# Patient Record
Sex: MALE | Race: OTHER | HISPANIC OR LATINO | URBAN - METROPOLITAN AREA
[De-identification: names, ages, dates, MRNs, and addresses within clinical notes are randomized per-mention and may not be internally consistent; named-entity substitution may affect disease eponyms.]

---

## 2017-02-16 ENCOUNTER — EMERGENCY (EMERGENCY)
Facility: HOSPITAL | Age: 42
LOS: 1 days | Discharge: HOME | End: 2017-02-16
Attending: EMERGENCY MEDICINE
Payer: COMMERCIAL

## 2017-02-16 VITALS
HEIGHT: 70 IN | SYSTOLIC BLOOD PRESSURE: 200 MMHG | WEIGHT: 179.9 LBS | HEART RATE: 86 BPM | OXYGEN SATURATION: 97 % | DIASTOLIC BLOOD PRESSURE: 102 MMHG | RESPIRATION RATE: 16 BRPM

## 2017-02-16 DIAGNOSIS — K08.89 OTHER SPECIFIED DISORDERS OF TEETH AND SUPPORTING STRUCTURES: ICD-10-CM

## 2017-02-16 DIAGNOSIS — E78.00 PURE HYPERCHOLESTEROLEMIA, UNSPECIFIED: ICD-10-CM

## 2017-02-16 DIAGNOSIS — E11.649 TYPE 2 DIABETES MELLITUS WITH HYPOGLYCEMIA WITHOUT COMA: ICD-10-CM

## 2017-02-16 DIAGNOSIS — I10 ESSENTIAL (PRIMARY) HYPERTENSION: ICD-10-CM

## 2017-02-16 LAB
ALBUMIN SERPL ELPH-MCNC: 4.1 G/DL — SIGNIFICANT CHANGE UP (ref 3.3–5.2)
ALP SERPL-CCNC: 91 U/L — SIGNIFICANT CHANGE UP (ref 40–120)
ALT FLD-CCNC: 44 U/L — HIGH
ANION GAP SERPL CALC-SCNC: 13 MMOL/L — SIGNIFICANT CHANGE UP (ref 5–17)
ANISOCYTOSIS BLD QL: SLIGHT — SIGNIFICANT CHANGE UP
APTT BLD: 32.9 SEC — SIGNIFICANT CHANGE UP (ref 27.5–37.4)
AST SERPL-CCNC: 30 U/L — SIGNIFICANT CHANGE UP
BASOPHILS # BLD AUTO: 0 K/UL — SIGNIFICANT CHANGE UP (ref 0–0.2)
BASOPHILS NFR BLD AUTO: 0 % — SIGNIFICANT CHANGE UP (ref 0–2)
BILIRUB SERPL-MCNC: 0.1 MG/DL — LOW (ref 0.4–2)
BUN SERPL-MCNC: 41 MG/DL — HIGH (ref 8–20)
CALCIUM SERPL-MCNC: 9 MG/DL — SIGNIFICANT CHANGE UP (ref 8.6–10.2)
CHLORIDE SERPL-SCNC: 102 MMOL/L — SIGNIFICANT CHANGE UP (ref 98–107)
CO2 SERPL-SCNC: 26 MMOL/L — SIGNIFICANT CHANGE UP (ref 22–29)
CREAT SERPL-MCNC: 1.53 MG/DL — HIGH (ref 0.5–1.3)
EOSINOPHIL # BLD AUTO: 0.1 K/UL — SIGNIFICANT CHANGE UP (ref 0–0.5)
EOSINOPHIL NFR BLD AUTO: 0 % — SIGNIFICANT CHANGE UP (ref 0–5)
GLUCOSE SERPL-MCNC: 34 MG/DL — CRITICAL LOW (ref 70–115)
HCT VFR BLD CALC: 32.8 % — LOW (ref 42–52)
HGB BLD-MCNC: 11.1 G/DL — LOW (ref 14–18)
INR BLD: 1.02 RATIO — SIGNIFICANT CHANGE UP (ref 0.88–1.16)
LYMPHOCYTES # BLD AUTO: 2 K/UL — SIGNIFICANT CHANGE UP (ref 1–4.8)
LYMPHOCYTES # BLD AUTO: 23 % — SIGNIFICANT CHANGE UP (ref 20–55)
MCHC RBC-ENTMCNC: 27.3 PG — SIGNIFICANT CHANGE UP (ref 27–31)
MCHC RBC-ENTMCNC: 33.8 G/DL — SIGNIFICANT CHANGE UP (ref 32–36)
MCV RBC AUTO: 80.8 FL — SIGNIFICANT CHANGE UP (ref 80–94)
MICROCYTES BLD QL: SLIGHT — SIGNIFICANT CHANGE UP
MONOCYTES # BLD AUTO: 0.8 K/UL — SIGNIFICANT CHANGE UP (ref 0–0.8)
MONOCYTES NFR BLD AUTO: 5 % — SIGNIFICANT CHANGE UP (ref 3–10)
NEUTROPHILS # BLD AUTO: 4.9 K/UL — SIGNIFICANT CHANGE UP (ref 1.8–8)
NEUTROPHILS NFR BLD AUTO: 67 % — SIGNIFICANT CHANGE UP (ref 37–73)
PLAT MORPH BLD: NORMAL — SIGNIFICANT CHANGE UP
PLATELET # BLD AUTO: 263 K/UL — SIGNIFICANT CHANGE UP (ref 150–400)
POTASSIUM SERPL-MCNC: 4.8 MMOL/L — SIGNIFICANT CHANGE UP (ref 3.5–5.3)
POTASSIUM SERPL-SCNC: 4.8 MMOL/L — SIGNIFICANT CHANGE UP (ref 3.5–5.3)
PROT SERPL-MCNC: 8.2 G/DL — SIGNIFICANT CHANGE UP (ref 6.6–8.7)
PROTHROM AB SERPL-ACNC: 11.2 SEC — SIGNIFICANT CHANGE UP (ref 10–13.1)
RBC # BLD: 4.06 M/UL — LOW (ref 4.6–6.2)
RBC # FLD: 13.1 % — SIGNIFICANT CHANGE UP (ref 11–15.6)
RBC BLD AUTO: ABNORMAL
SODIUM SERPL-SCNC: 141 MMOL/L — SIGNIFICANT CHANGE UP (ref 135–145)
VARIANT LYMPHS # BLD: 5 % — SIGNIFICANT CHANGE UP (ref 0–6)
WBC # BLD: 7.8 K/UL — SIGNIFICANT CHANGE UP (ref 4.8–10.8)
WBC # FLD AUTO: 7.8 K/UL — SIGNIFICANT CHANGE UP (ref 4.8–10.8)

## 2017-02-16 PROCEDURE — 85027 COMPLETE CBC AUTOMATED: CPT

## 2017-02-16 PROCEDURE — 93010 ELECTROCARDIOGRAM REPORT: CPT

## 2017-02-16 PROCEDURE — 82962 GLUCOSE BLOOD TEST: CPT

## 2017-02-16 PROCEDURE — 93005 ELECTROCARDIOGRAM TRACING: CPT

## 2017-02-16 PROCEDURE — 85730 THROMBOPLASTIN TIME PARTIAL: CPT

## 2017-02-16 PROCEDURE — 99284 EMERGENCY DEPT VISIT MOD MDM: CPT | Mod: 25

## 2017-02-16 PROCEDURE — 96374 THER/PROPH/DIAG INJ IV PUSH: CPT

## 2017-02-16 PROCEDURE — 99284 EMERGENCY DEPT VISIT MOD MDM: CPT

## 2017-02-16 PROCEDURE — 80053 COMPREHEN METABOLIC PANEL: CPT

## 2017-02-16 PROCEDURE — 85610 PROTHROMBIN TIME: CPT

## 2017-02-16 RX ORDER — DEXTROSE 50 % IN WATER 50 %
50 SYRINGE (ML) INTRAVENOUS ONCE
Qty: 0 | Refills: 0 | Status: COMPLETED | OUTPATIENT
Start: 2017-02-16 | End: 2017-02-16

## 2017-02-16 RX ADMIN — Medication 50 MILLILITER(S): at 21:08

## 2017-02-16 NOTE — ED PROVIDER NOTE - CONSTITUTIONAL, MLM
normal... Initial impression, pt lethargic appearing but barely responsive to tactile stimuli. However pt responded to d50 and became awake, alert, and oriented.

## 2017-02-16 NOTE — ED PROVIDER NOTE - PROGRESS NOTE DETAILS
1 amp of d50 given Pt was educated and was told to make sure eh is consistently compliant with his BP meds. He was made aware of risks for stroke and MI. Pt was educated on the importance of taking his blood sugar before taking his medication. Pt with 2 normal finger sticks, Pt is stable.

## 2017-02-16 NOTE — ED PROVIDER NOTE - OBJECTIVE STATEMENT
40 y/o M with h/o Type 1 DM and HTN presents to the ED BIBA  minimally responsive. Pt was found by EMS at pt's work, pt was sitting in his chair diaphoretic and minimally responsive to stimuli. Pt's BS in the field was 47, currently in the ED his BS is 38. Pt takes Lantis and Humalog. He states he has been eating a low carb diet recently and has had a toothache. He last took 28 units of Humalog today at 1pm and ate a [employer] sandwich. Pt admits he is not compliant with his BP meds. He denies any CP, SOB, or difficulty breathing. no further complaints at this time.

## 2017-02-16 NOTE — ED PROVIDER NOTE - NS ED MD SCRIBE ATTENDING SCRIBE SECTIONS
DISPOSITION/HISTORY OF PRESENT ILLNESS/REVIEW OF SYSTEMS/HIV/VITAL SIGNS( Pullset)/PAST MEDICAL/SURGICAL/SOCIAL HISTORY/PHYSICAL EXAM

## 2017-02-16 NOTE — ED ADULT NURSE NOTE - OBJECTIVE STATEMENT
Patient A&Ox4, stated took insulin 28 units after eating a sandwich. BGL dropped, last thing he remembered was waking up to a nurse in front of him. Stated he "feels great" at this time. Also stated this is the first time has occurred. Respirations even & unlabored. Denies any chest pain. IV site patent. Will continue to monitor. Patient A&Ox4, stated took insulin 28 units after eating a sandwich. BGL dropped, next thing he remembered was "waking up to a nurse in front of him." Stated he "feels great" at this time. Also stated this is the first time has occurred. Does not check BGL prior to self administering insulin. Educated on importance of checking BGL prior to self-administering insulin. Verbalized an understanding. Respirations even & unlabored. Denies any chest pain. IV site patent. Will continue to monitor.

## 2017-02-17 VITALS
RESPIRATION RATE: 18 BRPM | HEART RATE: 92 BPM | OXYGEN SATURATION: 98 % | SYSTOLIC BLOOD PRESSURE: 176 MMHG | DIASTOLIC BLOOD PRESSURE: 70 MMHG

## 2018-03-30 ENCOUNTER — EMERGENCY (EMERGENCY)
Facility: HOSPITAL | Age: 43
LOS: 1 days | Discharge: LEFT BEFORE DISCHARGE | End: 2018-03-30
Attending: EMERGENCY MEDICINE | Admitting: EMERGENCY MEDICINE
Payer: COMMERCIAL

## 2018-03-30 VITALS
RESPIRATION RATE: 18 BRPM | OXYGEN SATURATION: 100 % | TEMPERATURE: 98 F | HEIGHT: 66 IN | WEIGHT: 175.05 LBS | SYSTOLIC BLOOD PRESSURE: 242 MMHG | HEART RATE: 91 BPM | DIASTOLIC BLOOD PRESSURE: 113 MMHG

## 2018-03-30 PROCEDURE — 99282 EMERGENCY DEPT VISIT SF MDM: CPT | Mod: 25

## 2018-03-30 PROCEDURE — 82962 GLUCOSE BLOOD TEST: CPT

## 2018-03-30 PROCEDURE — 99283 EMERGENCY DEPT VISIT LOW MDM: CPT

## 2018-03-30 NOTE — ED ADULT NURSE NOTE - CHIEF COMPLAINT QUOTE
girlfriend noted pts fs to be 38.  pt was given OJ and oral glucose prior to arrival.  pt noted to be hypertensive was scheduled for fistuala placement today at [place].  pt states that he fasted last night didn't take bp meds but did take insulin

## 2018-03-30 NOTE — ED ADULT TRIAGE NOTE - CHIEF COMPLAINT QUOTE
girlfriend noted pts fs to be 38.  pt was given OJ and oral glucose prior to arrival.  pt noted to be hypertensive was scheduled for fistuala placement today at [place]. girlfriend noted pts fs to be 38.  pt was given OJ and oral glucose prior to arrival.  pt noted to be hypertensive was scheduled for fistuala placement today at [place].  pt states that he fasted last night didn't take bp meds but did take insulin

## 2018-03-30 NOTE — ED PROVIDER NOTE - MEDICAL DECISION MAKING DETAILS
well explained reasons for BP and low glucose today; patient otherwise assymptomatic; recommended staying for labs and correction of sugar and BP; patient however is adamant about making his appointment in 30 minutes to [place] for creation of av fistula; advised at length about potential problems during transport, but insists on signing out AMA; all risks explained, will d/c    Pt understands and recalls risks of leaving against medical advice, including death from hypertensive crisis, stroke, and hypoglycemia. Pt states that pt will see PMD. Pt advised to return to the ED if pt feels worse.

## 2018-03-30 NOTE — ED PROVIDER NOTE - OBJECTIVE STATEMENT
42 y/o M pt with hx of renal failure, HTN (takes Labetalol, Nifedipine, sodium bicarbonate, omeprazole) and DM (Hemolog and Lantis) presents to ED for hypoglycemia. Pt was scheduled for fistula today for future dialysis. In preparation for surgery, he was told to not take HTN medication last night and cut back on insulin. pt usually takes lantis, he took 30 instead of normal 48 last night. pt has renal failure and has been on specific diet the last few days and says his blood sugar has been low. Pt's last PO intake was 16 hours ago (yesterday afternoon). Pt states his girlfriend was worried about his blood sugar and she called EMS. Denies any symptoms at this time. 44 y/o M pt with hx of renal failure, HTN (takes Labetalol, Nifedipine, sodium bicarbonate, omeprazole) and DM (Hemolog and Lantis) presents to ED for hypoglycemia. Pt was scheduled for fistula today for future dialysis. In preparation for surgery, he was told to not take HTN medication last night and cut back on insulin. pt usually takes lantis, he took 30 instead of normal 48 last night. pt has renal failure and has been on specific diet the last few days and says his blood sugar has been low. Pt's last PO intake was 16 hours ago (yesterday afternoon). Pt states his girlfriend was worried about his blood sugar and she called EMS. Denies any symptoms at this time. AGain, did not take HTN meds last night or this morning

## 2018-03-30 NOTE — ED ADULT NURSE NOTE - CAS EDN DISCHARGE INTERVENTIONS
Pt was not assessed by this RN. Pt was AMA'd/dispo'd prior to RN assessment. Paperwork obtained by Dr. Hall

## 2018-04-01 ENCOUNTER — OUTPATIENT (OUTPATIENT)
Dept: OUTPATIENT SERVICES | Facility: HOSPITAL | Age: 43
LOS: 1 days | End: 2018-04-01
Payer: MEDICAID

## 2018-04-01 PROCEDURE — G9001: CPT

## 2018-04-10 DIAGNOSIS — R69 ILLNESS, UNSPECIFIED: ICD-10-CM

## 2018-11-20 ENCOUNTER — APPOINTMENT (OUTPATIENT)
Dept: FAMILY MEDICINE | Facility: CLINIC | Age: 43
End: 2018-11-20
Payer: MEDICAID

## 2018-11-20 VITALS
HEIGHT: 67 IN | HEART RATE: 71 BPM | DIASTOLIC BLOOD PRESSURE: 74 MMHG | WEIGHT: 172 LBS | BODY MASS INDEX: 27 KG/M2 | OXYGEN SATURATION: 98 % | SYSTOLIC BLOOD PRESSURE: 124 MMHG

## 2018-11-20 DIAGNOSIS — Z83.3 FAMILY HISTORY OF DIABETES MELLITUS: ICD-10-CM

## 2018-11-20 DIAGNOSIS — Z12.83 ENCOUNTER FOR SCREENING FOR MALIGNANT NEOPLASM OF SKIN: ICD-10-CM

## 2018-11-20 DIAGNOSIS — Z12.5 ENCOUNTER FOR SCREENING FOR MALIGNANT NEOPLASM OF PROSTATE: ICD-10-CM

## 2018-11-20 DIAGNOSIS — R09.82 POSTNASAL DRIP: ICD-10-CM

## 2018-11-20 PROCEDURE — 99386 PREV VISIT NEW AGE 40-64: CPT | Mod: 25

## 2018-11-20 PROCEDURE — 36415 COLL VENOUS BLD VENIPUNCTURE: CPT

## 2018-11-20 RX ORDER — NIFEDIPINE 30 MG/1
30 TABLET, EXTENDED RELEASE ORAL
Refills: 0 | Status: ACTIVE | COMMUNITY

## 2018-11-20 RX ORDER — FLUTICASONE PROPIONATE 50 UG/1
50 SPRAY, METERED NASAL DAILY
Qty: 1 | Refills: 3 | Status: ACTIVE | COMMUNITY
Start: 2018-11-20 | End: 1900-01-01

## 2018-11-20 RX ORDER — FUROSEMIDE 40 MG/1
40 TABLET ORAL
Refills: 0 | Status: COMPLETED | COMMUNITY
End: 2018-11-20

## 2018-11-20 RX ORDER — INDOMETHACIN 25 MG/1
25 CAPSULE ORAL
Refills: 0 | Status: ACTIVE | COMMUNITY

## 2018-11-20 RX ORDER — CALCIUM ACETATE 667 MG/1
667 CAPSULE ORAL
Refills: 0 | Status: ACTIVE | COMMUNITY

## 2018-11-20 RX ORDER — COLCHICINE 0.6 MG/1
0.6 TABLET, FILM COATED ORAL
Refills: 0 | Status: ACTIVE | COMMUNITY

## 2018-11-20 NOTE — PHYSICAL EXAM

## 2018-11-20 NOTE — HISTORY OF PRESENT ILLNESS
[FreeTextEntry1] : NP-CPE [de-identified] : 43 year old male type 1 diabetic (diagnosed in teens)\par \par does not see an endocrinologist\par \par goes to dialysis 3 times a week ([facility] Dr. Montoya)\par started dialysis in may of this year\par on kidney translplant list through [facility]\par \par take 2 omega 3 tabs daily\par \par last hba1c is 7.9\par \par \par

## 2018-11-21 LAB
25(OH)D3 SERPL-MCNC: 13.2 NG/ML
ALBUMIN SERPL ELPH-MCNC: 4.7 G/DL
ALP BLD-CCNC: 79 U/L
ALT SERPL-CCNC: 22 U/L
ANION GAP SERPL CALC-SCNC: 22 MMOL/L
APPEARANCE: ABNORMAL
AST SERPL-CCNC: 16 U/L
BACTERIA: NEGATIVE
BASOPHILS # BLD AUTO: 0.03 K/UL
BASOPHILS NFR BLD AUTO: 0.3 %
BILIRUB SERPL-MCNC: 0.3 MG/DL
BILIRUBIN URINE: NEGATIVE
BLOOD URINE: ABNORMAL
BUN SERPL-MCNC: 57 MG/DL
CALCIUM SERPL-MCNC: 9 MG/DL
CHLORIDE SERPL-SCNC: 94 MMOL/L
CHOLEST SERPL-MCNC: 250 MG/DL
CHOLEST/HDLC SERPL: 9.6 RATIO
CO2 SERPL-SCNC: 21 MMOL/L
COLOR: YELLOW
CREAT SERPL-MCNC: 8.7 MG/DL
EOSINOPHIL # BLD AUTO: 0.21 K/UL
EOSINOPHIL NFR BLD AUTO: 2.3 %
GLUCOSE QUALITATIVE U: NEGATIVE MG/DL
GLUCOSE SERPL-MCNC: 197 MG/DL
HBA1C MFR BLD HPLC: 8.6 %
HCT VFR BLD CALC: 37.7 %
HCV AB SER QL: NONREACTIVE
HCV S/CO RATIO: 0.25 S/CO
HDLC SERPL-MCNC: 26 MG/DL
HGB BLD-MCNC: 12.3 G/DL
HYALINE CASTS: 0 /LPF
IMM GRANULOCYTES NFR BLD AUTO: 0.4 %
KETONES URINE: ABNORMAL
LDLC SERPL CALC-MCNC: NORMAL
LEUKOCYTE ESTERASE URINE: ABNORMAL
LYMPHOCYTES # BLD AUTO: 2.36 K/UL
LYMPHOCYTES NFR BLD AUTO: 26.3 %
MAN DIFF?: NORMAL
MCHC RBC-ENTMCNC: 28.7 PG
MCHC RBC-ENTMCNC: 32.6 GM/DL
MCV RBC AUTO: 87.9 FL
MICROSCOPIC-UA: NORMAL
MONOCYTES # BLD AUTO: 0.64 K/UL
MONOCYTES NFR BLD AUTO: 7.1 %
NEUTROPHILS # BLD AUTO: 5.71 K/UL
NEUTROPHILS NFR BLD AUTO: 63.6 %
NITRITE URINE: NEGATIVE
PH URINE: 5
PLATELET # BLD AUTO: 352 K/UL
POTASSIUM SERPL-SCNC: 5.2 MMOL/L
PROT SERPL-MCNC: 8.5 G/DL
PROTEIN URINE: 300 MG/DL
PSA FREE FLD-MCNC: 62
PSA FREE SERPL-MCNC: 0.49 NG/ML
PSA SERPL-MCNC: 0.79 NG/ML
RBC # BLD: 4.29 M/UL
RBC # FLD: 14.4 %
RED BLOOD CELLS URINE: 2 /HPF
SODIUM SERPL-SCNC: 137 MMOL/L
SPECIFIC GRAVITY URINE: 1.02
SQUAMOUS EPITHELIAL CELLS: 3 /HPF
TRIGL SERPL-MCNC: 787 MG/DL
TSH SERPL-ACNC: 2.06 UIU/ML
URATE SERPL-MCNC: 5.9 MG/DL
URINE COMMENTS: NORMAL
UROBILINOGEN URINE: NEGATIVE MG/DL
WBC # FLD AUTO: 8.99 K/UL
WHITE BLOOD CELLS URINE: 62 /HPF

## 2018-11-28 ENCOUNTER — APPOINTMENT (OUTPATIENT)
Dept: ENDOCRINOLOGY | Facility: CLINIC | Age: 43
End: 2018-11-28

## 2019-01-15 ENCOUNTER — RECORD ABSTRACTING (OUTPATIENT)
Age: 44
End: 2019-01-15

## 2019-01-23 ENCOUNTER — APPOINTMENT (OUTPATIENT)
Dept: ENDOCRINOLOGY | Facility: CLINIC | Age: 44
End: 2019-01-23
Payer: MEDICAID

## 2019-01-23 VITALS
WEIGHT: 175 LBS | HEART RATE: 67 BPM | HEIGHT: 66 IN | DIASTOLIC BLOOD PRESSURE: 80 MMHG | BODY MASS INDEX: 28.12 KG/M2 | SYSTOLIC BLOOD PRESSURE: 122 MMHG

## 2019-01-23 DIAGNOSIS — Z87.891 PERSONAL HISTORY OF NICOTINE DEPENDENCE: ICD-10-CM

## 2019-01-23 DIAGNOSIS — Z80.9 FAMILY HISTORY OF MALIGNANT NEOPLASM, UNSPECIFIED: ICD-10-CM

## 2019-01-23 DIAGNOSIS — E78.00 PURE HYPERCHOLESTEROLEMIA, UNSPECIFIED: ICD-10-CM

## 2019-01-23 DIAGNOSIS — Z72.3 LACK OF PHYSICAL EXERCISE: ICD-10-CM

## 2019-01-23 PROCEDURE — 99204 OFFICE O/P NEW MOD 45 MIN: CPT | Mod: 25

## 2019-01-23 PROCEDURE — 82962 GLUCOSE BLOOD TEST: CPT

## 2019-01-23 RX ORDER — INSULIN GLARGINE 100 [IU]/ML
100 INJECTION, SOLUTION SUBCUTANEOUS
Refills: 0 | Status: DISCONTINUED | COMMUNITY
End: 2019-01-23

## 2019-01-23 RX ORDER — BLOOD SUGAR DIAGNOSTIC
STRIP MISCELLANEOUS
Qty: 100 | Refills: 0 | Status: ACTIVE | COMMUNITY
Start: 2018-02-14

## 2019-01-23 RX ORDER — FOLIC ACID/VIT B COMPLEX AND C 0.8 MG
0.8 TABLET ORAL
Refills: 0 | Status: ACTIVE | COMMUNITY

## 2019-01-23 RX ORDER — PREDNISONE 10 MG/1
10 TABLET ORAL
Refills: 0 | Status: DISCONTINUED | COMMUNITY
End: 2019-01-23

## 2019-01-23 RX ORDER — PEN NEEDLE, DIABETIC 29 G X1/2"
32G X 4 MM NEEDLE, DISPOSABLE MISCELLANEOUS
Refills: 1 | Status: ACTIVE | COMMUNITY

## 2019-01-23 RX ORDER — VIT B COMP NO.3/FOLIC/C/BIOTIN 1 MG-60 MG
1 TABLET ORAL
Qty: 30 | Refills: 0 | Status: ACTIVE | COMMUNITY
Start: 2018-12-17

## 2019-01-23 RX ORDER — PEN NEEDLE, DIABETIC 29 G X1/2"
32G X 4 MM NEEDLE, DISPOSABLE MISCELLANEOUS
Qty: 4 | Refills: 1 | Status: ACTIVE | COMMUNITY
Start: 2019-01-23 | End: 1900-01-01

## 2019-01-23 RX ORDER — METOCLOPRAMIDE 5 MG/1
5 TABLET ORAL DAILY
Refills: 0 | Status: ACTIVE | COMMUNITY

## 2019-01-27 PROBLEM — Z72.3 DOES NOT EXERCISE: Status: ACTIVE | Noted: 2019-01-27

## 2019-01-27 PROBLEM — Z80.9 FAMILY HISTORY OF MALIGNANT NEOPLASM: Status: ACTIVE | Noted: 2019-01-23

## 2019-01-27 PROBLEM — Z87.891 FORMER SMOKER: Status: ACTIVE | Noted: 2019-01-27

## 2019-01-27 PROBLEM — E78.00 ELEVATED CHOLESTEROL: Status: ACTIVE | Noted: 2019-01-23

## 2019-01-27 NOTE — HISTORY OF PRESENT ILLNESS
[FreeTextEntry1] : Pt with h/o Dm since 2000 - initally controlled on oral meds then transitioned to insulin  a few years later \par Pt lowest A1c 7.0 Highest 8.5 \par  currently with ESRD onHD - awaiting renal transplantation sees Dr Montoya at [facility] \par In past pt was on higher dose of LAntus 48 units but since eating  better - \par  has reduced it to curent dose of 30 units  of Toujeo \par Humalog up to 15 units post meals\par In past pt has had  epsidoes of severe hypoglycemia but has never had LOC \par \par ESRD on HD since June 2018 \par \par +gerd \par  + high TG \par \par laser eye surgery \par \par some trouble digestign foods - had EGD and colonscopy all ok -  [___] : [unfilled] [Hypoglycemia] : hypoglycemic

## 2019-01-27 NOTE — REASON FOR VISIT
[Initial Eval - Existing Diagnosis] : an initial evaluation of an existing diagnosis [FreeTextEntry1] : T1DM

## 2019-01-27 NOTE — ASSESSMENT
[FreeTextEntry1] : 1D with ESRD on HD \par pt interested in pump therapy - would be better if pt did carb counting- will meet with RD CDE \par jolly BS tid ac min  can do some 2 hr p  checks\par goal 2 hr pp 200-220 \par to avoid lowBS for him as he is at high risk for severe hypoglycemia \par decrease toujeo to 26 units \par  not sure what sensors if any can be used with pt on dialysis - currently dexocm and bel are not supported\par  Goal -180 with A1c of 7.5-8 on HD \par ? is pt candidate for  dual pancreas and renal transpant- willdw his transplant team \par \par  However pt with inc TG - ?lipoprotein lipase def - not sure - will check labs \par \par keep on omega 3's \par \par Rash on skin -see derm \par \par ?gastroaptresiss- see GI  ? exocrine pancretic insufficiency \par \par HTN stabel  feet see podiatry to cut thickened nails

## 2019-01-27 NOTE — REVIEW OF SYSTEMS
[Negative] : Heme/Lymph [FreeTextEntry6] : only had dysone a prior to going on HD  [FreeTextEntry7] : food does not digest properly - never tested for gastroparesis

## 2019-04-02 ENCOUNTER — APPOINTMENT (OUTPATIENT)
Dept: FAMILY MEDICINE | Facility: CLINIC | Age: 44
End: 2019-04-02

## 2019-05-06 ENCOUNTER — APPOINTMENT (OUTPATIENT)
Dept: FAMILY MEDICINE | Facility: CLINIC | Age: 44
End: 2019-05-06
Payer: MEDICAID

## 2019-05-06 VITALS
BODY MASS INDEX: 28.12 KG/M2 | HEART RATE: 86 BPM | DIASTOLIC BLOOD PRESSURE: 96 MMHG | WEIGHT: 175 LBS | SYSTOLIC BLOOD PRESSURE: 178 MMHG | OXYGEN SATURATION: 98 % | HEIGHT: 66 IN

## 2019-05-06 VITALS — DIASTOLIC BLOOD PRESSURE: 80 MMHG | SYSTOLIC BLOOD PRESSURE: 160 MMHG

## 2019-05-06 DIAGNOSIS — I10 ESSENTIAL (PRIMARY) HYPERTENSION: ICD-10-CM

## 2019-05-06 PROCEDURE — 99214 OFFICE O/P EST MOD 30 MIN: CPT | Mod: 25

## 2019-05-06 PROCEDURE — 36415 COLL VENOUS BLD VENIPUNCTURE: CPT

## 2019-05-06 RX ORDER — FUROSEMIDE 80 MG/1
80 TABLET ORAL
Qty: 60 | Refills: 0 | Status: DISCONTINUED | COMMUNITY
Start: 2018-07-27 | End: 2019-05-06

## 2019-05-06 RX ORDER — AMOXICILLIN AND CLAVULANATE POTASSIUM 500; 125 MG/1; MG/1
500-125 TABLET, FILM COATED ORAL
Qty: 28 | Refills: 0 | Status: DISCONTINUED | COMMUNITY
Start: 2018-08-29 | End: 2019-05-06

## 2019-05-06 RX ORDER — AMOXICILLIN 500 MG/1
500 TABLET, FILM COATED ORAL
Qty: 30 | Refills: 0 | Status: DISCONTINUED | COMMUNITY
Start: 2018-08-29 | End: 2019-05-06

## 2019-05-06 RX ORDER — ATENOLOL 50 MG/1
50 TABLET ORAL
Qty: 30 | Refills: 0 | Status: COMPLETED | COMMUNITY
Start: 2018-07-26 | End: 2019-05-06

## 2019-05-06 NOTE — PLAN
[FreeTextEntry1] : \par - Advised to decrease toujeo to 30 units due to hypoglycemic episodes, will send rx for the interim until he sees endo \par - Blood work today \par - Follow up with Dr. Addison in 2 wks 5/30/19\par - Encouraged diet modifications and exercise \par \par

## 2019-05-06 NOTE — END OF VISIT
[FreeTextEntry3] : Medical record entries made by the scribe today today, were at my direction and personally dictated to them by me, Dr. Joanna Akins on May 06, 2019. I have reviewed the chart and agree that the record accurately reflects my personal performance of the history, physical exam, assessment, and plan.

## 2019-05-06 NOTE — ADDENDUM
[FreeTextEntry1] : I, Rajani Ying acting as a scribe for Dr. Joanna Akins on May 06, 2019  at 3:21 PM\par

## 2019-05-06 NOTE — HISTORY OF PRESENT ILLNESS
[FreeTextEntry1] : 6 month f/u x kidney disease/DM [de-identified] : LENNIE is a 44 year male here for follow up with a history of T1DM, ESRD on HD, awaiting renal transplant at [facility]. Now has HD M/W/F/Sat 4x a week Followed by Dr. Montoya at [place]. Was recently seen by endocrine Dr. Addison on 1/23/19. Taking toujeo 40 units and has been having hypoglycemic episodes at low as 50 mg/dL. Has a follow up with endo in 2 weeks. Takes metoprolol QHS and nifedipine. BP today 178/96. Did not take nifedipine today due to HD treatment and was told to avoid this med on HD days. Takes 4 fishoil pills per day due to cholesterol. He does not regularly follow up with cardiologist Dr. Gisella Gonzalez, follow up last year.

## 2019-05-30 ENCOUNTER — APPOINTMENT (OUTPATIENT)
Dept: ENDOCRINOLOGY | Facility: CLINIC | Age: 44
End: 2019-05-30

## 2019-08-28 LAB
25(OH)D3 SERPL-MCNC: 10.1 NG/ML
ALBUMIN SERPL ELPH-MCNC: 4.7 G/DL
ALP BLD-CCNC: 56 U/L
ALT SERPL-CCNC: 22 U/L
ANION GAP SERPL CALC-SCNC: 21 MMOL/L
APPEARANCE: ABNORMAL
AST SERPL-CCNC: 14 U/L
BACTERIA: NEGATIVE
BASOPHILS # BLD AUTO: 0.05 K/UL
BASOPHILS NFR BLD AUTO: 0.9 %
BILIRUB SERPL-MCNC: 0.2 MG/DL
BILIRUBIN URINE: NEGATIVE
BLOOD URINE: ABNORMAL
BUN SERPL-MCNC: 55 MG/DL
CALCIUM SERPL-MCNC: 8.6 MG/DL
CHLORIDE SERPL-SCNC: 93 MMOL/L
CHOLEST SERPL-MCNC: 299 MG/DL
CHOLEST/HDLC SERPL: 11.1 RATIO
CO2 SERPL-SCNC: 22 MMOL/L
COLOR: YELLOW
CREAT SERPL-MCNC: 9.63 MG/DL
EOSINOPHIL # BLD AUTO: 0.28 K/UL
EOSINOPHIL NFR BLD AUTO: 5.2 %
ESTIMATED AVERAGE GLUCOSE: 186 MG/DL
GLUCOSE QUALITATIVE U: ABNORMAL
GLUCOSE SERPL-MCNC: 244 MG/DL
HBA1C MFR BLD HPLC: 8.1 %
HCT VFR BLD CALC: 33.5 %
HDLC SERPL-MCNC: 27 MG/DL
HGB BLD-MCNC: 11.2 G/DL
HYALINE CASTS: 0 /LPF
IMM GRANULOCYTES NFR BLD AUTO: 0.6 %
KETONES URINE: NEGATIVE
LDLC SERPL CALC-MCNC: NORMAL MG/DL
LEUKOCYTE ESTERASE URINE: NEGATIVE
LYMPHOCYTES # BLD AUTO: 1.74 K/UL
LYMPHOCYTES NFR BLD AUTO: 32.4 %
MAN DIFF?: NORMAL
MCHC RBC-ENTMCNC: 30.1 PG
MCHC RBC-ENTMCNC: 33.4 GM/DL
MCV RBC AUTO: 90.1 FL
MICROSCOPIC-UA: NORMAL
MONOCYTES # BLD AUTO: 0.69 K/UL
MONOCYTES NFR BLD AUTO: 12.8 %
NEUTROPHILS # BLD AUTO: 2.58 K/UL
NEUTROPHILS NFR BLD AUTO: 48.1 %
NITRITE URINE: NEGATIVE
PH URINE: 6
PLATELET # BLD AUTO: 390 K/UL
POTASSIUM SERPL-SCNC: 5.5 MMOL/L
PROT SERPL-MCNC: 8.2 G/DL
PROTEIN URINE: ABNORMAL
RBC # BLD: 3.72 M/UL
RBC # FLD: 14.6 %
RED BLOOD CELLS URINE: 2 /HPF
SODIUM SERPL-SCNC: 136 MMOL/L
SPECIFIC GRAVITY URINE: 1.02
SQUAMOUS EPITHELIAL CELLS: >27 /HPF
TRIGL SERPL-MCNC: 1175 MG/DL
UROBILINOGEN URINE: NORMAL
WBC # FLD AUTO: 5.37 K/UL
WHITE BLOOD CELLS URINE: 5 /HPF

## 2019-09-16 ENCOUNTER — MEDICATION RENEWAL (OUTPATIENT)
Age: 44
End: 2019-09-16

## 2019-09-17 ENCOUNTER — MEDICATION RENEWAL (OUTPATIENT)
Age: 44
End: 2019-09-17

## 2019-09-18 ENCOUNTER — MEDICATION RENEWAL (OUTPATIENT)
Age: 44
End: 2019-09-18

## 2019-09-18 ENCOUNTER — RX RENEWAL (OUTPATIENT)
Age: 44
End: 2019-09-18

## 2019-09-18 RX ORDER — INSULIN LISPRO 100 U/ML
100 INJECTION, SOLUTION SUBCUTANEOUS
Qty: 3 | Refills: 0 | Status: ACTIVE | COMMUNITY
Start: 1900-01-01 | End: 1900-01-01

## 2019-09-18 RX ORDER — BLOOD SUGAR DIAGNOSTIC
STRIP MISCELLANEOUS
Qty: 4 | Refills: 0 | Status: ACTIVE | COMMUNITY
Start: 2019-09-17 | End: 1900-01-01

## 2019-10-04 RX ORDER — INSULIN GLARGINE 300 U/ML
300 INJECTION, SOLUTION SUBCUTANEOUS
Qty: 1 | Refills: 0 | Status: ACTIVE | COMMUNITY
Start: 1900-01-01 | End: 1900-01-01

## 2019-10-30 ENCOUNTER — APPOINTMENT (OUTPATIENT)
Dept: ENDOCRINOLOGY | Facility: CLINIC | Age: 44
End: 2019-10-30

## 2020-06-18 ENCOUNTER — NON-APPOINTMENT (OUTPATIENT)
Age: 45
End: 2020-06-18

## 2020-06-18 ENCOUNTER — APPOINTMENT (OUTPATIENT)
Dept: FAMILY MEDICINE | Facility: CLINIC | Age: 45
End: 2020-06-18
Payer: MEDICAID

## 2020-06-18 VITALS
HEART RATE: 80 BPM | HEIGHT: 66 IN | OXYGEN SATURATION: 98 % | SYSTOLIC BLOOD PRESSURE: 128 MMHG | TEMPERATURE: 97.8 F | DIASTOLIC BLOOD PRESSURE: 78 MMHG | WEIGHT: 176 LBS | BODY MASS INDEX: 28.28 KG/M2

## 2020-06-18 VITALS — DIASTOLIC BLOOD PRESSURE: 80 MMHG | SYSTOLIC BLOOD PRESSURE: 110 MMHG

## 2020-06-18 DIAGNOSIS — Z00.00 ENCOUNTER FOR GENERAL ADULT MEDICAL EXAMINATION W/OUT ABNORMAL FINDINGS: ICD-10-CM

## 2020-06-18 DIAGNOSIS — Z23 ENCOUNTER FOR IMMUNIZATION: ICD-10-CM

## 2020-06-18 PROCEDURE — G0444 DEPRESSION SCREEN ANNUAL: CPT

## 2020-06-18 PROCEDURE — 36415 COLL VENOUS BLD VENIPUNCTURE: CPT

## 2020-06-18 PROCEDURE — 93000 ELECTROCARDIOGRAM COMPLETE: CPT | Mod: 59

## 2020-06-18 PROCEDURE — 99396 PREV VISIT EST AGE 40-64: CPT | Mod: 25

## 2020-06-18 NOTE — PHYSICAL EXAM
[No Acute Distress] : no acute distress [Well Developed] : well developed [Well Nourished] : well nourished [Well-Appearing] : well-appearing [PERRL] : pupils equal round and reactive to light [Normal Sclera/Conjunctiva] : normal sclera/conjunctiva [EOMI] : extraocular movements intact [Normal Outer Ear/Nose] : the outer ears and nose were normal in appearance [Normal Oropharynx] : the oropharynx was normal [No JVD] : no jugular venous distention [Supple] : supple [No Lymphadenopathy] : no lymphadenopathy [No Accessory Muscle Use] : no accessory muscle use [Thyroid Normal, No Nodules] : the thyroid was normal and there were no nodules present [No Respiratory Distress] : no respiratory distress  [Regular Rhythm] : with a regular rhythm [Normal Rate] : normal rate  [Clear to Auscultation] : lungs were clear to auscultation bilaterally [No Murmur] : no murmur heard [Normal S1, S2] : normal S1 and S2 [No Abdominal Bruit] : a ~M bruit was not heard ~T in the abdomen [No Carotid Bruits] : no carotid bruits [No Varicosities] : no varicosities [Pedal Pulses Present] : the pedal pulses are present [No Palpable Aorta] : no palpable aorta [No Edema] : there was no peripheral edema [No Extremity Clubbing/Cyanosis] : no extremity clubbing/cyanosis [Non Tender] : non-tender [Soft] : abdomen soft [Non-distended] : non-distended [No Masses] : no abdominal mass palpated [No HSM] : no HSM [Normal Bowel Sounds] : normal bowel sounds [Normal Posterior Cervical Nodes] : no posterior cervical lymphadenopathy [Normal Anterior Cervical Nodes] : no anterior cervical lymphadenopathy [No CVA Tenderness] : no CVA  tenderness [No Spinal Tenderness] : no spinal tenderness [Grossly Normal Strength/Tone] : grossly normal strength/tone [No Joint Swelling] : no joint swelling [No Rash] : no rash [Coordination Grossly Intact] : coordination grossly intact [No Focal Deficits] : no focal deficits [Normal Gait] : normal gait [Deep Tendon Reflexes (DTR)] : deep tendon reflexes were 2+ and symmetric [Normal Affect] : the affect was normal [Normal Insight/Judgement] : insight and judgment were intact

## 2020-06-18 NOTE — HEALTH RISK ASSESSMENT
[Good] : ~his/her~  mood as  good [None] : None [Employed] : employed [With Family] : lives with family [] :  [Fully functional (bathing, dressing, toileting, transferring, walking, feeding)] : Fully functional (bathing, dressing, toileting, transferring, walking, feeding) [Feels Safe at Home] : Feels safe at home [Fully functional (using the telephone, shopping, preparing meals, housekeeping, doing laundry, using] : Fully functional and needs no help or supervision to perform IADLs (using the telephone, shopping, preparing meals, housekeeping, doing laundry, using transportation, managing medications and managing finances)

## 2020-06-18 NOTE — HISTORY OF PRESENT ILLNESS
[FreeTextEntry1] : CPE [de-identified] : LENNIE is a 45 year male here for CPE. Mood is good. Currently taking Admelog 15 units x2 day, Toujeo 30 units q HS. Taking Metoprolol Succinate 50 mg, Metoclopramide 5 mg, Indomethacin 25 mg, Colchicine 0.6 mg, Phos Binder 667 mg daily. Taking Nifedipine 30 mg PRN. Following up with endocrinology in [place] 8/20. Last appointment was in November. Still waiting for kidney transplant. Has not felt sick in over 10 months. Receiving dialysis q other day, follows up with nephrologist x3 week. Denies COVID sxs. \par Follows up with Ophthalmologist q 3 months for diabetic retinopathy/cataracts. Last treatment was last week. Requesting podiatry referral due to hx toenail fungus. Pt reports he had pneumo vaccine 11/19.

## 2020-06-18 NOTE — END OF VISIT
[FreeTextEntry3] : Medical record entries made by the scribe today today, were at my direction and personally dictated to them by me, Dr. Joanna Akins on Jun 18, 2020. I have reviewed the chart and agree that the record accurately reflects my personal performance of the history, physical exam, assessment, and plan.\par \par

## 2020-06-18 NOTE — ADDENDUM
[FreeTextEntry1] : I, Lori Castorena acting as a scribe for Dr. Joanna Akins on Jun 18, 2020  at 8:46 AM\par

## 2020-06-19 LAB
25(OH)D3 SERPL-MCNC: 19.3 NG/ML
ALBUMIN SERPL ELPH-MCNC: 4.8 G/DL
ALP BLD-CCNC: 93 U/L
ALT SERPL-CCNC: 54 U/L
ANION GAP SERPL CALC-SCNC: 23 MMOL/L
APPEARANCE: CLEAR
AST SERPL-CCNC: 16 U/L
BACTERIA: NEGATIVE
BASOPHILS # BLD AUTO: 0.07 K/UL
BASOPHILS NFR BLD AUTO: 1.2 %
BILIRUB SERPL-MCNC: 0.3 MG/DL
BILIRUBIN URINE: NEGATIVE
BLOOD URINE: NORMAL
BUN SERPL-MCNC: 51 MG/DL
CALCIUM SERPL-MCNC: 8.8 MG/DL
CHLORIDE SERPL-SCNC: 91 MMOL/L
CHOLEST SERPL-MCNC: 284 MG/DL
CHOLEST/HDLC SERPL: 11.9 RATIO
CO2 SERPL-SCNC: 23 MMOL/L
COLOR: NORMAL
CREAT SERPL-MCNC: 8.97 MG/DL
EOSINOPHIL # BLD AUTO: 0.19 K/UL
EOSINOPHIL NFR BLD AUTO: 3.1 %
ESTIMATED AVERAGE GLUCOSE: 157 MG/DL
GLUCOSE QUALITATIVE U: NORMAL
GLUCOSE SERPL-MCNC: 180 MG/DL
GRANULAR CASTS: 0 /LPF
HBA1C MFR BLD HPLC: 7.1 %
HCT VFR BLD CALC: 44.2 %
HDLC SERPL-MCNC: 24 MG/DL
HGB BLD-MCNC: 13.9 G/DL
HIV1+2 AB SPEC QL IA.RAPID: NONREACTIVE
HYALINE CASTS: 1 /LPF
IMM GRANULOCYTES NFR BLD AUTO: 0.3 %
KETONES URINE: NEGATIVE
LDLC SERPL CALC-MCNC: NORMAL MG/DL
LEUKOCYTE ESTERASE URINE: ABNORMAL
LYMPHOCYTES # BLD AUTO: 1.86 K/UL
LYMPHOCYTES NFR BLD AUTO: 30.6 %
MAN DIFF?: NORMAL
MCHC RBC-ENTMCNC: 28.5 PG
MCHC RBC-ENTMCNC: 31.4 GM/DL
MCV RBC AUTO: 90.6 FL
MICROSCOPIC-UA: NORMAL
MONOCYTES # BLD AUTO: 0.77 K/UL
MONOCYTES NFR BLD AUTO: 12.7 %
NEUTROPHILS # BLD AUTO: 3.16 K/UL
NEUTROPHILS NFR BLD AUTO: 52.1 %
NITRITE URINE: NEGATIVE
PH URINE: 6.5
PLATELET # BLD AUTO: 231 K/UL
POTASSIUM SERPL-SCNC: 5.8 MMOL/L
PROT SERPL-MCNC: 8.3 G/DL
PROTEIN URINE: ABNORMAL
PSA FREE FLD-MCNC: 68 %
PSA FREE SERPL-MCNC: 0.6 NG/ML
PSA SERPL-MCNC: 0.87 NG/ML
RBC # BLD: 4.88 M/UL
RBC # FLD: 13.6 %
RED BLOOD CELLS URINE: 6 /HPF
SARS-COV-2 IGG SERPL IA-ACNC: 0.01 INDEX
SARS-COV-2 IGG SERPL QL IA: NEGATIVE
SODIUM SERPL-SCNC: 137 MMOL/L
SPECIFIC GRAVITY URINE: 1.02
SQUAMOUS EPITHELIAL CELLS: 5 /HPF
TRIGL SERPL-MCNC: 1042 MG/DL
TSH SERPL-ACNC: 2.26 UIU/ML
URATE SERPL-MCNC: 5.7 MG/DL
URINE COMMENTS: NORMAL
UROBILINOGEN URINE: NORMAL
WBC # FLD AUTO: 6.07 K/UL
WHITE BLOOD CELLS URINE: 42 /HPF

## 2020-07-06 ENCOUNTER — TRANSCRIPTION ENCOUNTER (OUTPATIENT)
Age: 45
End: 2020-07-06

## 2020-07-06 DIAGNOSIS — Z11.59 ENCOUNTER FOR SCREENING FOR OTHER VIRAL DISEASES: ICD-10-CM

## 2020-07-30 ENCOUNTER — APPOINTMENT (OUTPATIENT)
Dept: UROLOGY | Facility: CLINIC | Age: 45
End: 2020-07-30

## 2020-08-13 ENCOUNTER — APPOINTMENT (OUTPATIENT)
Dept: UROLOGY | Facility: CLINIC | Age: 45
End: 2020-08-13
Payer: MEDICAID

## 2020-08-13 VITALS
HEIGHT: 66 IN | HEART RATE: 81 BPM | TEMPERATURE: 97.6 F | WEIGHT: 176 LBS | BODY MASS INDEX: 28.28 KG/M2 | SYSTOLIC BLOOD PRESSURE: 144 MMHG | DIASTOLIC BLOOD PRESSURE: 80 MMHG

## 2020-08-13 DIAGNOSIS — R31.29 OTHER MICROSCOPIC HEMATURIA: ICD-10-CM

## 2020-08-13 PROCEDURE — 99204 OFFICE O/P NEW MOD 45 MIN: CPT

## 2020-08-13 NOTE — PHYSICAL EXAM
[General Appearance - In No Acute Distress] : no acute distress [Normal Appearance] : normal appearance [General Appearance - Well Developed] : well developed [FreeTextEntry1] : normal peripheral circulation  [] : no respiratory distress [Abdomen Tenderness] : non-tender [Abdomen Soft] : soft [Abdomen Mass (___ Cm)] : no abdominal mass palpated [Costovertebral Angle Tenderness] : no ~M costovertebral angle tenderness [Urethral Meatus] : meatus normal [Epididymis] : the epididymides were normal [Penis Abnormality] : normal uncircumcised penis [Scrotum] : the scrotum was normal [Prostate Tenderness] : the prostate was not tender [Testes Mass (___cm)] : there were no testicular masses [Testes Tenderness] : no tenderness of the testes [No Prostate Nodules] : no prostate nodules [Prostate Size ___ gm] : prostate size [unfilled] gm [Skin Color & Pigmentation] : normal skin color and pigmentation [Normal Station and Gait] : the gait and station were normal for the patient's age [Oriented To Time, Place, And Person] : oriented to person, place, and time [No Focal Deficits] : no focal deficits [No Palpable Adenopathy] : no palpable adenopathy

## 2020-08-13 NOTE — ASSESSMENT
[FreeTextEntry1] : Anejaculation:\par Discussed it could from long standing Diabetes. \par \par Erectile dysfunction:\par Reviewed pathophysiology and differential diagnosis of erectile dysfunction with the patient. Discussed lifestyle changes. \par The patient was made aware that the current therapies for erectile dysfunction consisting of oral phosphodiesterase type 5 inhibitors, intra-urethral alprostadil, intracavernous vasoactive drug injection, vacuum constriction devices and penile prosthesis implantation. Relative risks and benefits, were discussed. All questions were answered.\par Will get Total, Free and Bio available Testosterone with Estradiol, LH, FSH and PRL. \par Asked to see Dr Garcai for further management of Penile duplex study and possible Intracavernosal injections Vs Inflatable Penile Prosthesis. \par \par Microhematuria:\par Discussed the differential diagnosis of hematuria including benign and malignant pathology- including but not limited to nephrolithiasis, bladder stone, urinary tract infection, glomerular disease, renal cancer, bladder cancer, prostate cancer. We also discussed the chance that workup will not reveal a source for the bleeding. The patient understands that the hematuria could be from an upper tract (kidney or ureter) or lower tract (bladder, urethra, prostate) and that workup includes imaging and direct visualization of all of these.\par \par Recommended work up with Urinalysis with microscopy, Urine culture, Urine cytology, CT Urogram and Cystoscopy. Will consider it. \par \par Wants to focus on Erectile dysfunction right now. \par

## 2020-08-13 NOTE — HISTORY OF PRESENT ILLNESS
[FreeTextEntry1] : 46 yo male presents for Erectile dysfunction. \par Has problem with attaining and maintaining erections. Rates erections as 0/5. \par Reports decreased libido. Has tried Phosphodiesterase 5 inhibitors in the past- partial response: 1/5. \par [facility] and was able to get good response. \par Also complaining of no ejaculation. Recently  and will like to be able to have child. \par \par Has long standing history of Diabetes. Has ESRD on HD. \par Urinates small amount 1-2 x a day. \par Denies dysuria, hematuria, lower abdominal or flank pain, fever, chills or rigors.

## 2020-08-14 LAB
ESTRADIOL SERPL-MCNC: 72 PG/ML
FSH SERPL-MCNC: 1.5 IU/L
LH SERPL-ACNC: 11.5 IU/L
PROLACTIN SERPL-MCNC: 14.2 NG/ML

## 2020-08-18 LAB
TESTOST BND SERPL-MCNC: 16.6 NG/DL
TESTOSTERONE BIOAVAILABLE: 245 NG/DL
TESTOSTERONE TOTAL S: 874 NG/DL

## 2020-08-27 ENCOUNTER — APPOINTMENT (OUTPATIENT)
Dept: FAMILY MEDICINE | Facility: CLINIC | Age: 45
End: 2020-08-27
Payer: MEDICAID

## 2020-08-27 VITALS
TEMPERATURE: 97.4 F | HEART RATE: 78 BPM | WEIGHT: 161 LBS | SYSTOLIC BLOOD PRESSURE: 102 MMHG | HEIGHT: 66 IN | DIASTOLIC BLOOD PRESSURE: 72 MMHG | OXYGEN SATURATION: 98 % | BODY MASS INDEX: 25.88 KG/M2

## 2020-08-27 DIAGNOSIS — H26.9 UNSPECIFIED CATARACT: ICD-10-CM

## 2020-08-27 PROCEDURE — 99214 OFFICE O/P EST MOD 30 MIN: CPT

## 2020-08-27 RX ORDER — METOPROLOL SUCCINATE 50 MG/1
50 TABLET, EXTENDED RELEASE ORAL
Refills: 0 | Status: DISCONTINUED | COMMUNITY
End: 2020-08-27

## 2020-08-27 NOTE — RESULTS/DATA
[] : not indicated [de-identified] : no acute changes\par compared to prior year had negative stress in 8/19

## 2020-08-27 NOTE — HISTORY OF PRESENT ILLNESS
[No Pertinent Cardiac History] : no history of aortic stenosis, atrial fibrillation, coronary artery disease, recent myocardial infarction, or implantable device/pacemaker [No Pertinent Pulmonary History] : no history of asthma, COPD, sleep apnea, or smoking [No Adverse Anesthesia Reaction] : no adverse anesthesia reaction in self or family member [Aortic Stenosis] : no aortic stenosis [Atrial Fibrillation] : no atrial fibrillation [Coronary Artery Disease] : no coronary artery disease [Recent Myocardial Infarction] : no recent myocardial infarction [Implantable Device/Pacemaker] : no implantable device/pacemaker [Asthma] : no asthma [COPD] : no COPD [Smoker] : not a smoker [Sleep Apnea] : no sleep apnea [Chronic Anticoagulation] : no chronic anticoagulation [Diabetes] : no diabetes [Chronic Kidney Disease] : no chronic kidney disease [FreeTextEntry1] : left eye cataract surgery  [FreeTextEntry2] : 09/08/2020 [FreeTextEntry4] : LENNIE is a 45 year male here for a medical clearance. Pt is having a LT eye cataract surgery on 9/8/20 with Dr. Chris Verduzco due to vision issues. Pt stated that he will be Covid tested 72 hours before and he was weaned off of metoprolol due to his regular dialysis. Pt reported that he has been taking Omega 3 for his elevated cholesterol.  [FreeTextEntry3] : Dr.Scott Verduzco

## 2020-08-27 NOTE — ASSESSMENT
[Patient Optimized for Surgery] : Patient optimized for surgery [No Further Testing Recommended] : no further testing recommended [FreeTextEntry4] : Reviewed hx with patient, no contradictions. Pt is medically stable. Currently taking Admelog Solostar 15 units , Calcium Acetate 667 Mg , Colcrys 0.6 MG , Fluticasone 50 MCG , Metoclopramide HCI 5 MG , Nephro-Aurelio .8 MG , NIFEdipine ER 30 MG , Rosuvastatin 20 MG, Omega 3 and Toujeo Max Solostar 30 units daily.

## 2020-08-27 NOTE — END OF VISIT
[FreeTextEntry3] : All medical record entries made by the benibrodger were at my, Dr. Joanna Akins direction and personally dictated by me on 08/27/2020 . I have reviewed the chart and agree that the record accurately reflects my personal performance of the history, physical exam, assessment and plan. I have also personally directed, reviewed, and agreed with the chart.

## 2020-09-01 ENCOUNTER — APPOINTMENT (OUTPATIENT)
Dept: UROLOGY | Facility: CLINIC | Age: 45
End: 2020-09-01
Payer: MEDICAID

## 2020-09-01 VITALS — TEMPERATURE: 98.8 F

## 2020-09-01 PROCEDURE — 99215 OFFICE O/P EST HI 40 MIN: CPT

## 2020-09-01 NOTE — ASSESSMENT
[FreeTextEntry1] : This pleasant  gentleman presents for evaluation of his sexual dysfunction. I have requested several blood studies.  Urine dip and microscopic analysis was requested. I have suggested a diagnostic injection and duplex ultrasound to evaluate his penile vasculature.  I will be better able to make more specific recommendations after additional test results return. \par 1. Review blood tests on follow up\par 2. Penile duplex study on follow up\par \par Consultation: 40 minutes  20 minutes reviewing his history and performing a physical examination.  20 minutes reviewing his prior medical records, discussing treatment options, writing prescriptions and request for blood studies and writing his note.\par \par \par \par \par \par \par

## 2020-09-01 NOTE — PHYSICAL EXAM
[General Appearance - Well Developed] : well developed [General Appearance - Well Nourished] : well nourished [Normal Appearance] : normal appearance [Well Groomed] : well groomed [General Appearance - In No Acute Distress] : no acute distress [Heart Rate And Rhythm] : Heart rate and rhythm were normal [Arterial Pulses Normal] : the pedal pulses were normal [Edema] : no peripheral edema [Respiration, Rhythm And Depth] : normal respiratory rhythm and effort [Exaggerated Use Of Accessory Muscles For Inspiration] : no accessory muscle use [Auscultation Breath Sounds / Voice Sounds] : lungs were clear to auscultation bilaterally [Chest Palpation] : palpation of the chest revealed no abnormalities [Lungs Percussion] : the lungs were normal to percussion [Bowel Sounds] : normal bowel sounds [Abdomen Soft] : soft [Abdomen Tenderness] : non-tender [Abdomen Mass (___ Cm)] : no abdominal mass palpated [Abdomen Hernia] : no hernia was discovered [Costovertebral Angle Tenderness] : no ~M costovertebral angle tenderness [Urethral Meatus] : meatus normal [Urinary Bladder Findings] : the bladder was normal on palpation [Scrotum] : the scrotum was normal [Rectal Exam - Seminal Vesicles] : the seminal vesicles were normal [Epididymis] : the epididymides were normal [Testes Tenderness] : no tenderness of the testes [Testes Mass (___cm)] : there were no testicular masses [Anus Abnormality] : the anus and perineum were normal [Prostate Enlargement] : the prostate was not enlarged [Prostate Tenderness] : the prostate was not tender [No Prostate Nodules] : no prostate nodules [Normal Station and Gait] : the gait and station were normal for the patient's age [Skin Color & Pigmentation] : normal skin color and pigmentation [Skin Turgor] : supple [] : no rash [Skin Lesions] : no skin lesions [No Focal Deficits] : no focal deficits [Sensation] : the sensory exam was normal to light touch and pinprick [Motor Exam] : the motor exam was normal [Oriented To Time, Place, And Person] : oriented to person, place, and time [Affect] : the affect was normal [Mood] : the mood was normal [Not Anxious] : not anxious [No Palpable Adenopathy] : no palpable adenopathy [Cervical Lymph Nodes Enlarged Posterior Bilaterally] : posterior cervical [Cervical Lymph Nodes Enlarged Anterior Bilaterally] : anterior cervical [Supraclavicular Lymph Nodes Enlarged Bilaterally] : supraclavicular [Axillary Lymph Nodes Enlarged Bilaterally] : axillary [Femoral Lymph Nodes Enlarged Bilaterally] : femoral [Inguinal Lymph Nodes Enlarged Bilaterally] : inguinal [Penis Abnormality] : normal uncircumcised penis

## 2020-09-01 NOTE — HISTORY OF PRESENT ILLNESS
[FreeTextEntry1] : Patient is a 45-year-old diabetic male with chronic renal disease on dialysis who presents with a chief complaint of erectile dysfunction.  We reviewed the questionnaire he completed in detail. He was  one year prior. He had been on penile injection therapy in the past. His erections are not modified with the degree of sexual stimulation.   He states that his erections presently are often less than 0 out of 10 in both tumescence and rigidity, insufficient for penetration.   He often ejaculates through a flaccid phallus.  He has difficulty maintaining an erection. He describes a normal libido.  His sexual dysfunction occurs with both sexual relations and masturbation.  His erections are not improved with PDE5 inhibitors.   His partner is understanding and was unable to be with him at the visit today. He is .  \par \par His past medical history is non-contributory.  In his present occupation he has no known toxin exposure. He does not smoke and drinks socially .  He has no known drug allergies. His review of systems and past medical history demonstrates no significant urologic issues (see patient completed questionnaire). His family history demonstrates no significant urologic issues.

## 2020-09-02 DIAGNOSIS — Z01.818 ENCOUNTER FOR OTHER PREPROCEDURAL EXAMINATION: ICD-10-CM

## 2020-09-03 LAB
25(OH)D3 SERPL-MCNC: 15.7 NG/ML
ALBUMIN SERPL ELPH-MCNC: 4.5 G/DL
ALP BLD-CCNC: 53 U/L
ALT SERPL-CCNC: 19 U/L
ANION GAP SERPL CALC-SCNC: 21 MMOL/L
AST SERPL-CCNC: 14 U/L
BASOPHILS # BLD AUTO: 0.06 K/UL
BASOPHILS NFR BLD AUTO: 0.9 %
BILIRUB SERPL-MCNC: 0.2 MG/DL
BUN SERPL-MCNC: 67 MG/DL
CALCIUM SERPL-MCNC: 9.8 MG/DL
CHLORIDE SERPL-SCNC: 92 MMOL/L
CHOLEST SERPL-MCNC: 244 MG/DL
CHOLEST/HDLC SERPL: 10.4 RATIO
CO2 SERPL-SCNC: 25 MMOL/L
CREAT SERPL-MCNC: 9.42 MG/DL
EOSINOPHIL # BLD AUTO: 0.2 K/UL
EOSINOPHIL NFR BLD AUTO: 3 %
ESTIMATED AVERAGE GLUCOSE: 157 MG/DL
ESTRADIOL SERPL-MCNC: 62 PG/ML
FSH SERPL-MCNC: 1.5 IU/L
GLUCOSE SERPL-MCNC: 140 MG/DL
HBA1C MFR BLD HPLC: 7.1 %
HCT VFR BLD CALC: 41.1 %
HDLC SERPL-MCNC: 24 MG/DL
HGB BLD-MCNC: 13.4 G/DL
IMM GRANULOCYTES NFR BLD AUTO: 0.5 %
LDLC SERPL CALC-MCNC: NORMAL MG/DL
LH SERPL-ACNC: 9.1 IU/L
LYMPHOCYTES # BLD AUTO: 2.34 K/UL
LYMPHOCYTES NFR BLD AUTO: 35.7 %
MAN DIFF?: NORMAL
MCHC RBC-ENTMCNC: 29.1 PG
MCHC RBC-ENTMCNC: 32.6 GM/DL
MCV RBC AUTO: 89.3 FL
MONOCYTES # BLD AUTO: 0.87 K/UL
MONOCYTES NFR BLD AUTO: 13.3 %
NEUTROPHILS # BLD AUTO: 3.06 K/UL
NEUTROPHILS NFR BLD AUTO: 46.6 %
PLATELET # BLD AUTO: 324 K/UL
POTASSIUM SERPL-SCNC: 5.2 MMOL/L
PROLACTIN SERPL-MCNC: 14.3 NG/ML
PROT SERPL-MCNC: 7.7 G/DL
PSA SERPL-MCNC: 0.69 NG/ML
RBC # BLD: 4.6 M/UL
RBC # FLD: 14.3 %
SODIUM SERPL-SCNC: 138 MMOL/L
TRIGL SERPL-MCNC: 807 MG/DL
TSH SERPL-ACNC: 1.57 UIU/ML
WBC # FLD AUTO: 6.56 K/UL

## 2020-09-05 ENCOUNTER — APPOINTMENT (OUTPATIENT)
Dept: DISASTER EMERGENCY | Facility: CLINIC | Age: 45
End: 2020-09-05

## 2020-09-05 LAB
TESTOST BND SERPL-MCNC: 17.3 PG/ML
TESTOST SERPL-MCNC: 486.5 NG/DL

## 2020-09-06 LAB — SARS-COV-2 N GENE NPH QL NAA+PROBE: NOT DETECTED

## 2020-09-08 ENCOUNTER — OUTPATIENT (OUTPATIENT)
Dept: OUTPATIENT SERVICES | Facility: HOSPITAL | Age: 45
LOS: 1 days | End: 2020-09-08

## 2020-09-17 ENCOUNTER — OUTPATIENT (OUTPATIENT)
Dept: OUTPATIENT SERVICES | Facility: HOSPITAL | Age: 45
LOS: 1 days | End: 2020-09-17
Payer: MEDICARE

## 2020-09-17 ENCOUNTER — APPOINTMENT (OUTPATIENT)
Dept: UROLOGY | Facility: CLINIC | Age: 45
End: 2020-09-17
Payer: MEDICAID

## 2020-09-17 VITALS — TEMPERATURE: 97.9 F

## 2020-09-17 DIAGNOSIS — R35.0 FREQUENCY OF MICTURITION: ICD-10-CM

## 2020-09-17 PROCEDURE — 54235 NJX CORPORA CAVERNOSA RX AGT: CPT

## 2020-09-17 PROCEDURE — 93980 PENILE VASCULAR STUDY: CPT | Mod: 26

## 2020-09-17 PROCEDURE — 99213 OFFICE O/P EST LOW 20 MIN: CPT | Mod: 25

## 2020-09-17 PROCEDURE — 93980 PENILE VASCULAR STUDY: CPT

## 2020-09-17 NOTE — ASSESSMENT
[FreeTextEntry1] : The patient returns for follow-up to review his recent blood studies and to discuss options for therapy. Blood studies demonstrated an elevated estradiol a low vitamin D 25 and elevated creatinine consistent with his chronic renal failure and an elevated serum glucose and dyslipidemia.  He is presently under medical care hemoglobin A1c is 7.1 testosterone was normal.\par \par Duplex ultrasound demonstrated significant areteriogenic dysfunction. Medical evaluation was recommended. Have him back for injection training of 0.6 cc of the Trimix.\par \par Consultation: 20 minutes  10 minutes reviewing his history and performing a physical examination.  10 minutes reviewing his ultrasound, discussing treatment options, writing prescriptions and request for blood studies and writing his note..\par \par \par \par \par \par \par

## 2020-09-17 NOTE — HISTORY OF PRESENT ILLNESS
[FreeTextEntry1] : The patient returns to review his recent blood studies and to discuss options for therapy.\par \par PMH: Patient is a  diabetic male with chronic renal disease on dialysis who presents with a chief complaint of erectile dysfunction. He had been on penile injection therapy in the past. His erections are not modified with the degree of sexual stimulation.   He states that his erections presently are often less than 0 out of 10 in both tumescence and rigidity, insufficient for penetration.   He often ejaculates through a flaccid phallus.  He has difficulty maintaining an erection. He describes a normal libido.  His sexual dysfunction occurs with both sexual relations and masturbation.  His erections are not improved with PDE5 inhibitors.   His partner is understanding and was unable to be with him at the visit today. He is .  \par \par His past medical history is non-contributory.  In his present occupation he has no known toxin exposure. He does not smoke and drinks socially .  He has no known drug allergies. His review of systems and past medical history demonstrates no significant urologic issues (see patient completed questionnaire). His family history demonstrates no significant urologic issues.

## 2020-09-24 ENCOUNTER — APPOINTMENT (OUTPATIENT)
Dept: FAMILY MEDICINE | Facility: CLINIC | Age: 45
End: 2020-09-24
Payer: MEDICARE

## 2020-09-24 VITALS
OXYGEN SATURATION: 97 % | BODY MASS INDEX: 27.48 KG/M2 | WEIGHT: 171 LBS | DIASTOLIC BLOOD PRESSURE: 82 MMHG | HEIGHT: 66 IN | TEMPERATURE: 97.9 F | HEART RATE: 57 BPM | SYSTOLIC BLOOD PRESSURE: 130 MMHG

## 2020-09-24 DIAGNOSIS — E10.9 TYPE 1 DIABETES MELLITUS WITHOUT COMPLICATIONS: ICD-10-CM

## 2020-09-24 DIAGNOSIS — N52.9 MALE ERECTILE DYSFUNCTION, UNSPECIFIED: ICD-10-CM

## 2020-09-24 PROCEDURE — 99213 OFFICE O/P EST LOW 20 MIN: CPT

## 2020-09-24 RX ORDER — ROSUVASTATIN CALCIUM 20 MG/1
20 TABLET, FILM COATED ORAL
Qty: 90 | Refills: 1 | Status: ACTIVE | COMMUNITY
Start: 2020-06-24 | End: 1900-01-01

## 2020-09-24 NOTE — PLAN
[FreeTextEntry1] : follow up in 6-8 weeks for repeat labs\par may increase dose of statin at that time

## 2020-09-24 NOTE — HISTORY OF PRESENT ILLNESS
[FreeTextEntry1] : pt. f/u with HLD.  [de-identified] : started rosuvastin about 3 months\par \par takes one omega 3 a day\par \par states he does eat a low carb low fat diet\par had labs done earlier this month at urologist\par tc down 40 points\par trigs down 200 points but still signioficantly elevated\par \par

## 2020-09-29 RX ORDER — OMEGA-3-ACID ETHYL ESTERS CAPSULES 1 G/1
1 CAPSULE, LIQUID FILLED ORAL
Qty: 360 | Refills: 1 | Status: ACTIVE | COMMUNITY
Start: 2020-09-24

## 2020-10-13 ENCOUNTER — APPOINTMENT (OUTPATIENT)
Dept: UROLOGY | Facility: CLINIC | Age: 45
End: 2020-10-13
Payer: MEDICARE

## 2020-10-27 ENCOUNTER — APPOINTMENT (OUTPATIENT)
Dept: UROLOGY | Facility: CLINIC | Age: 45
End: 2020-10-27
Payer: MEDICARE

## 2020-10-27 VITALS
SYSTOLIC BLOOD PRESSURE: 186 MMHG | TEMPERATURE: 97.1 F | HEIGHT: 66 IN | DIASTOLIC BLOOD PRESSURE: 93 MMHG | BODY MASS INDEX: 27.48 KG/M2 | RESPIRATION RATE: 16 BRPM | HEART RATE: 86 BPM | WEIGHT: 171 LBS

## 2020-10-27 VITALS — TEMPERATURE: 97.1 F

## 2020-10-27 PROCEDURE — 99214 OFFICE O/P EST MOD 30 MIN: CPT

## 2020-10-27 NOTE — HISTORY OF PRESENT ILLNESS
[FreeTextEntry1] : The patient returns for injection training and to discuss options for fertility.\par \par PMH: Patient is a  diabetic male with chronic renal disease on dialysis who presents with a chief complaint of erectile dysfunction. He had been on penile injection therapy in the past. His erections are not modified with the degree of sexual stimulation.   He states that his erections presently are often less than 0 out of 10 in both tumescence and rigidity, insufficient for penetration.   He often ejaculates through a flaccid phallus.  He has difficulty maintaining an erection. He describes a normal libido.  His sexual dysfunction occurs with both sexual relations and masturbation.  His erections are not improved with PDE5 inhibitors.   His partner is understanding and was unable to be with him at the visit today. He is .  \par \par His past medical history is non-contributory.  In his present occupation he has no known toxin exposure. He does not smoke and drinks socially .  He has no known drug allergies. His review of systems and past medical history demonstrates no significant urologic issues (see patient completed questionnaire). His family history demonstrates no significant urologic issues.

## 2020-10-27 NOTE — ASSESSMENT
[FreeTextEntry1] : Patient returned for his [first] penile injection training.  He stated that he has not been able to obtain an erection sufficient for penetration with PDE5 inhibitors.  He was not interested in using a vacuum constriction device at this time. He wanted to try penile injection therapy.\par \par We reviewed the procedure with the relative risks and benefits. A copy of the informed consent is on file. I assisted him in injecting [0.2] cc of the TriMix into the [right] corpora. At [30] minutes he had [80] % tumescence and [60] % rigidity. His erection dissipated prior to his leaving the office.\par \par We discussed the use of a pharmacologic agent in the diagnostic evaluation of organic and in special instances psychogenic erectile dysfunction.  He was made fully aware that several medications used may not be approved by the [employer] for this purpose, although they may be well recognized and accepted by the American [employer] Association as a treatment and diagnostic tool for impotence.  He understands that there still remains a need to standardize the indications, contraindications and dosage parameters for the testing as well as treatment purposes of this procedure. He understands that the TriMix is a compounded medication and that there are other, FDA approved, injectable medications available for  use. \par \par He received a brochure on injection therapy and I have taken particular care in reviewing carefully all potential complications of this procedure and made him  fully aware that even a death has been reported in conjunction with this therapy.  Never-the-less, weighing all risks and benefits that this procedure affords, he was willing to proceed with the use of a intracavernosal injection of the pharmacological agent prescribed and either compounded by a pharmaceutical company, pharmacist or by Dr. Garcia as part of the diagnostic evaluation and/or use of this pharmacologic agent as a treatment for  erectile dysfunction. He was made aware that a prolonged erection (priapism) might result from penile injections and that it must be treated within four hours to prevent damage to the erectile mechanism of his penis. He acknowledged that he must notify Dr. Garcia (or the covering doctor) and have this condition treated within fours hours should it occur. He had the opportunity to ask any questions all of which were answered to his satisfaction.  He will be injecting 0.2 to 0.3 cc of the Trimix.\par \par He will call with any concerns or questions. I will see him back in follow up prior to dispensing additional medication.  Recent blood test demonstrated a normal LH, FSH and testosterone.  He had markedly elevated triglycerides cholesterol.  He is under medical care for his chronic renal disease.\par \par We also discussed his fertility potential.  He has not had a semen analysis and we will obtain one.  I will discuss the results with him in 4 weeks by telehealth as well as review the efficacy of the Trimix. \par \par Consultation: 30 minutes:  10 minutes reviewing his history and performing a physical examination.  20 minutes reviewing the process of penile injection therapy and fertility evaluation writing for a semen analysis and blood studies ,discussing treatment options and writing his note. There was also additional time in preparation for today's visit.\par \par \par \par \par \par  worsening

## 2020-11-12 ENCOUNTER — APPOINTMENT (OUTPATIENT)
Dept: FAMILY MEDICINE | Facility: CLINIC | Age: 45
End: 2020-11-12
Payer: MEDICARE

## 2020-11-12 VITALS — SYSTOLIC BLOOD PRESSURE: 130 MMHG | DIASTOLIC BLOOD PRESSURE: 80 MMHG

## 2020-11-12 VITALS
SYSTOLIC BLOOD PRESSURE: 140 MMHG | HEIGHT: 66 IN | HEART RATE: 86 BPM | OXYGEN SATURATION: 98 % | BODY MASS INDEX: 27 KG/M2 | DIASTOLIC BLOOD PRESSURE: 84 MMHG | TEMPERATURE: 98.7 F | WEIGHT: 168 LBS

## 2020-11-12 PROCEDURE — 99214 OFFICE O/P EST MOD 30 MIN: CPT

## 2020-11-12 NOTE — ADDENDUM
[FreeTextEntry1] : I, Monet Shaw acting as a scribe for Dr. Joanna Akins on Nov 12, 2020  at 9:01 AM\par

## 2020-11-12 NOTE — END OF VISIT
[FreeTextEntry3] : Medical record entries made by the scribe today today, were at my direction and personally dictated to them by me, Dr. Joanna Akins on Nov 12, 2020. I have reviewed the chart and agree that the record accurately reflects my personal performance of the history, physical exam, assessment, and plan.\par

## 2020-11-12 NOTE — HISTORY OF PRESENT ILLNESS
[FreeTextEntry1] : pt. f/u with labs  [de-identified] : LENNIE is a 45 year male here to review labs. States he has been compliant with taking Rosuvastatin 20 mg. Denies muscles aces while on medication. Does report non stop nausea x 2 weeks. States he is unsure if it is related to cholesterol medication. \par Reports his A1c last month improved to 6.8. He has been working on his diet. Follows up with podiatry and has no issues from his diabetes but does report fungal infections. Overall he feels well. \par \par

## 2020-11-12 NOTE — PLAN
[FreeTextEntry1] : \par - Blood work today \par - Advised patient to bring reports from endocrinologist at [facility] \par - Received the Flu vaccine in October \par - Continue following up with podiatry \par - Encouraged exercise

## 2020-11-13 LAB
ALBUMIN SERPL ELPH-MCNC: 4.7 G/DL
ALP BLD-CCNC: 47 U/L
ALT SERPL-CCNC: 89 U/L
ANION GAP SERPL CALC-SCNC: 20 MMOL/L
AST SERPL-CCNC: 53 U/L
BILIRUB SERPL-MCNC: 0.4 MG/DL
BUN SERPL-MCNC: 33 MG/DL
CALCIUM SERPL-MCNC: 9.8 MG/DL
CHLORIDE SERPL-SCNC: 94 MMOL/L
CHOLEST SERPL-MCNC: 169 MG/DL
CO2 SERPL-SCNC: 27 MMOL/L
CREAT SERPL-MCNC: 7.96 MG/DL
GLUCOSE SERPL-MCNC: 91 MG/DL
HDLC SERPL-MCNC: 32 MG/DL
LDLC SERPL CALC-MCNC: 96 MG/DL
NONHDLC SERPL-MCNC: 137 MG/DL
POTASSIUM SERPL-SCNC: 5 MMOL/L
PROT SERPL-MCNC: 7.9 G/DL
SODIUM SERPL-SCNC: 142 MMOL/L
TRIGL SERPL-MCNC: 203 MG/DL

## 2020-12-08 ENCOUNTER — APPOINTMENT (OUTPATIENT)
Dept: UROLOGY | Facility: CLINIC | Age: 45
End: 2020-12-08

## 2020-12-08 NOTE — ASSESSMENT
[FreeTextEntry1] : The patient returns for follow-up.  He has been injecting 0.4 cc of the Trimix.  He is doing well and has no complaints.\par \par \par Consultation: 30 minutes:  10 minutes reviewing his history and performing a physical examination.  20 minutes reviewing the ultrasound, writing for prescription medications and blood studies ,discussing treatment options and writing his note. There was also additional time in preparation for today's visit.\par

## 2020-12-08 NOTE — HISTORY OF PRESENT ILLNESS
[FreeTextEntry1] : The patient returns for follow-up.  He has been injecting 0.4 cc of the Trimix.  He is doing well and has no complaints.\par \par PMH: Patient is a  diabetic male with chronic renal disease on dialysis who presents with a chief complaint of erectile dysfunction. He had been on penile injection therapy in the past. His erections are not modified with the degree of sexual stimulation.   He states that his erections presently are often less than 0 out of 10 in both tumescence and rigidity, insufficient for penetration.   He often ejaculates through a flaccid phallus.  He has difficulty maintaining an erection. He describes a normal libido.  His sexual dysfunction occurs with both sexual relations and masturbation.  His erections are not improved with PDE5 inhibitors.   His partner is understanding and was unable to be with him at the visit today. He is .  \par \par His past medical history is non-contributory.  In his present occupation he has no known toxin exposure. He does not smoke and drinks socially .  He has no known drug allergies. His review of systems and past medical history demonstrates no significant urologic issues (see patient completed questionnaire). His family history demonstrates no significant urologic issues.

## 2021-01-08 ENCOUNTER — APPOINTMENT (OUTPATIENT)
Dept: UROLOGY | Facility: CLINIC | Age: 46
End: 2021-01-08

## 2021-04-22 ENCOUNTER — LABORATORY RESULT (OUTPATIENT)
Age: 46
End: 2021-04-22

## 2021-04-22 ENCOUNTER — APPOINTMENT (OUTPATIENT)
Dept: FAMILY MEDICINE | Facility: CLINIC | Age: 46
End: 2021-04-22
Payer: MEDICARE

## 2021-04-22 VITALS
BODY MASS INDEX: 27.32 KG/M2 | TEMPERATURE: 97.9 F | OXYGEN SATURATION: 98 % | WEIGHT: 170 LBS | SYSTOLIC BLOOD PRESSURE: 140 MMHG | HEIGHT: 66 IN | DIASTOLIC BLOOD PRESSURE: 80 MMHG | HEART RATE: 102 BPM

## 2021-04-22 DIAGNOSIS — R11.0 NAUSEA: ICD-10-CM

## 2021-04-22 DIAGNOSIS — K29.70 GASTRITIS, UNSPECIFIED, W/OUT BLEEDING: ICD-10-CM

## 2021-04-22 DIAGNOSIS — Z99.2 DEPENDENCE ON RENAL DIALYSIS: ICD-10-CM

## 2021-04-22 DIAGNOSIS — E78.1 PURE HYPERGLYCERIDEMIA: ICD-10-CM

## 2021-04-22 DIAGNOSIS — E78.5 HYPERLIPIDEMIA, UNSPECIFIED: ICD-10-CM

## 2021-04-22 DIAGNOSIS — Z87.448 PERSONAL HISTORY OF OTHER DISEASES OF URINARY SYSTEM: ICD-10-CM

## 2021-04-22 PROCEDURE — 99214 OFFICE O/P EST MOD 30 MIN: CPT

## 2021-04-22 NOTE — END OF VISIT
[FreeTextEntry3] : Medical record entries made by the scribe today today, were at my direction and personally dictated to them by me, Dr. Joanna Akins on Apr 22, 2021. I have reviewed the chart and agree that the record accurately reflects my personal performance of the history, physical exam, assessment, and plan.\par

## 2021-04-22 NOTE — REVIEW OF SYSTEMS
[Chills] : chills [Nausea] : nausea [Constipation] : constipation [Diarrhea] : diarrhea [Negative] : Heme/Lymph [Vomiting] : no vomiting

## 2021-04-22 NOTE — HISTORY OF PRESENT ILLNESS
[FreeTextEntry1] : Follow Up HLD [de-identified] : LENNIE is a 46 year male here for HLD/ elevated liver enzymes follow up. States his sugar this morning was 88. States he has not taken any of his medications today. Notes he stopped taking his cholesterol medication. Reports he had kidney transplant in December. Doing well and being followed by transplant team as needed. No longer on Dialysis. States in the last 2 weeks he has been feeling terrible. Sx include nausea w/o vomiting, diarrhea, constipation, occasional chills, heart burn. States he cannot vomit but is producing a lot of sputum. He thinks his treatment of the constipation is causing the diarrhea. Also reports he feels bloated. 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 He has appt with his transplant team tomorrow. \par \par \par

## 2021-04-22 NOTE — ADDENDUM
[FreeTextEntry1] : I, Monet Shaw acting as a scribe for Dr. Joanna Akins on Apr 22, 2021  at 11:14 AM\par

## 2021-04-26 LAB
ALBUMIN SERPL ELPH-MCNC: 4.5 G/DL
ALP BLD-CCNC: 112 U/L
ALT SERPL-CCNC: 15 U/L
AMYLASE/CREAT SERPL: 58 U/L
ANION GAP SERPL CALC-SCNC: 12 MMOL/L
AST SERPL-CCNC: 22 U/L
BASOPHILS # BLD AUTO: 0.07 K/UL
BASOPHILS NFR BLD AUTO: 2.6 %
BILIRUB SERPL-MCNC: 0.3 MG/DL
BUN SERPL-MCNC: 42 MG/DL
CALCIUM SERPL-MCNC: 10.2 MG/DL
CHLORIDE SERPL-SCNC: 108 MMOL/L
CHOLEST SERPL-MCNC: 199 MG/DL
CO2 SERPL-SCNC: 18 MMOL/L
CREAT SERPL-MCNC: 2.08 MG/DL
EOSINOPHIL # BLD AUTO: 0.07 K/UL
EOSINOPHIL NFR BLD AUTO: 2.6 %
GLUCOSE SERPL-MCNC: 132 MG/DL
HCT VFR BLD CALC: 41.2 %
HDLC SERPL-MCNC: 34 MG/DL
HGB BLD-MCNC: 13.9 G/DL
LDLC SERPL CALC-MCNC: 122 MG/DL
LPL SERPL-CCNC: 17 U/L
LYMPHOCYTES # BLD AUTO: 0.74 K/UL
LYMPHOCYTES NFR BLD AUTO: 28.5 %
MAN DIFF?: NORMAL
MCHC RBC-ENTMCNC: 28.7 PG
MCHC RBC-ENTMCNC: 33.7 GM/DL
MCV RBC AUTO: 84.9 FL
MONOCYTES # BLD AUTO: 0.22 K/UL
MONOCYTES NFR BLD AUTO: 8.6 %
NEUTROPHILS # BLD AUTO: 1.33 K/UL
NEUTROPHILS NFR BLD AUTO: 35.3 %
NONHDLC SERPL-MCNC: 166 MG/DL
PLATELET # BLD AUTO: 288 K/UL
POTASSIUM SERPL-SCNC: 5 MMOL/L
PROT SERPL-MCNC: 7.7 G/DL
RBC # BLD: 4.85 M/UL
RBC # FLD: 12.5 %
SODIUM SERPL-SCNC: 138 MMOL/L
TRIGL SERPL-MCNC: 218 MG/DL
WBC # FLD AUTO: 2.61 K/UL

## 2021-08-05 NOTE — ED ADULT NURSE NOTE - CARDIO WDL
ALLERGIES:   Allergen Reactions   • Acetaminophen PRURITUS   • Flexeril [Cyclobenzaprine Hcl] PRURITUS   • Metoclopramide Tremors     Possible tardive dyskinesia   • Nortriptyline PRURITUS   • Salicylates PRURITUS   • Tricyclic Antidepressants RASH and PRURITUS       CC:  Skin lesion    HISTORY OF PRESENT ILLNESS:  The patient is a 79 year old female here for skin lesions.    Lesion of concern located on the  back, and face  Duration: 1 year  Changes: Unsure  Bleeding: Yes  Painful: yes  Itching: Yes      YES []   NO [x]  Currently pregnant, nursing or attempting to conceive    Past Medical History:   Diagnosis Date   • Acute bronchitis    • Allergic rhinitis    • Allergy    • Anemia    • Arthritis     fibromyalgia   • Blood clot associated with vein wall inflammation     PE   • Bronchitis    • Chronic anxiety    • Chronic depression    • Chronic LBP    • Clotting disorder (CMS/HCC)    • Congestive cardiac failure (CMS/HCC)    • Diabetes mellitus (CMS/HCC)    • Esophageal reflux    • GERD (gastroesophageal reflux disease)    • Hypertension    • Neuromuscular disorder (CMS/HCC)    • Nuclear sclerotic cataract of both eyes 7/12/2019   • YOSHI (obstructive sleep apnea)     no cpap   • Other chronic pain     back, generalized   • Pneumonia    • Polymyalgia rheumatica (CMS/HCC)    • Positive PPD    • RAD (reactive airway disease)    • Renal cell carcinoma (CMS/HCC)     right, s/p right nephrectomy 4/10/2003   • Tendinitis of right shoulder     chronic   • Unspecified sinusitis (chronic)    • Urinary incontinence    • 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, s/p transurethral resection of tumor 2009 & transurethral fulgeration of tumor in 03/2010       Current Outpatient Medications   Medication   • albuterol (ProAir HFA) 108 (90 Base) MCG/ACT inhaler   • HYDROcodone-homatropine (HYDROMET) 5-1.5 MG/5ML syrup   • Xarelto 20 MG Tab   • donepezil (ARICEPT) 10 MG tablet   • valsartan (DIOVAN) 40 MG tablet    • Symbicort 80-4.5 MCG/ACT inhaler   • pantoprazole (PROTONIX) 40 MG tablet   • metFORMIN (GLUCOPHAGE) 500 MG tablet   • furosemide (LASIX) 20 MG tablet   • oxyCODONE, IMM REL, (ROXICODONE) 5 MG immediate release tablet   • amLODIPine (NORVASC) 5 MG tablet   • losartan (COZAAR) 25 MG tablet   • polyethylene glycol (MIRALAX) 17 GM/SCOOP powder   • OLANZapine (ZyPREXA) 5 MG tablet   • FeroSul 325 (65 Fe) MG tablet   • Probiotic Product (Florajen3) capsule   • rivaroxaban (Xarelto) 15 MG Tab   • aspirin (ECOTRIN) 81 MG EC tablet   • OLANZapine (ZyPREXA) 5 MG tablet   • DULoxetine (CYMBALTA) 30 MG capsule   • Myrbetriq 50 MG 24 hr tablet   • buPROPion XL (WELLBUTRIN XL) 150 MG 24 hr tablet   • ferrous sulfate 325 (65 FE) MG EC tablet   • atorvastatin (LIPITOR) 10 MG tablet   • Calcium 600 MG tablet   • fluticasone (FLONASE) 50 MCG/ACT nasal spray   • albuterol-ipratropium (COMBIVENT RESPIMAT) 100-20 MCG/ACT inhaler   • loratadine (CLARITIN) 10 MG tablet   • ACCU-CHEK FASTCLIX LANCETS Misc   • blood glucose (ACCU-CHEK SMARTVIEW) test strip   • Cholecalciferol 2000 UNITS Tab   • EASY TOUCH LANCETS 28G/TWIST Misc   • Lancet Devices (AUTOLET LANCING DEVICE) Misc   • Blood Glucose Monitoring Suppl (FREESTYLE LITE) Device   • Alcohol Swabs (ALCOHOL PADS) 70 % Pads   • Blood Glucose Calibration (FREESTYLE CONTROL SOLUTION) Liquid   • Blood Glucose Monitoring Suppl (BLOOD GLUCOSE METER) Kit   • Incontinence Supply Disposable (ADULT BRIEF MEDIUM) MISC     No current facility-administered medications for this visit.       EXAM:  The patient is pleasant, well appearing, no distress.  Mood and affect are appropriate.  Oriented to person, place, time.  There are exophytic inflamed pigmented papules on the upper back and the left nasal labial fold      IMPRESSION AND PLAN:  Shave removal performed.  PROCEDURE NOTE:  Indication:  Irritated nevus, symptoms, histology  Location:  Left nasal labial fold  Lesion size:  0.5 cm  After  verbal informed consent obtained, including the risks of bleeding, infection, and scarring, lesion cleansed with alcohol and anesthetized with 0.4ml  lidocaine with epinephrine. DermaBlade used to tangentially excise the entire clinically apparent lesion.  Hemostasis with drysol. The patient tolerated the procedure well and was given verbal and written wound care instructions. Specimen sent for histopathology. Wound dressed with petrolatum ointment and a bandage.    Shave removal performed.  PROCEDURE NOTE:  Indication:  Irritated nevus, symptoms, histology  Location:  Upper back  Lesion size:  0.8 cm  After verbal informed consent obtained, including the risks of bleeding, infection, and scarring, lesion cleansed with alcohol and anesthetized with 0.4ml  lidocaine with epinephrine. DermaBlade used to tangentially excise the entire clinically apparent lesion.  Hemostasis with drysol. The patient tolerated the procedure well and was given verbal and written wound care instructions. Specimen sent for histopathology. Wound dressed with petrolatum ointment and a bandage.        Written advice regarding photoprotection to reduce skin cancer risk and advice on safe  vitamin D sources provided.      FOLLOW UP:  prn    CC:  Nicolle Ayala MD     On 8/5/2021, IAmirah MA scribed the services personally performed by Nilam Del Rosario MD    The documentation recorded by the scribe accurately and completely reflects the service(s) I personally performed and the decisions made by me.         Normal rate, regular rhythm, normal S1, S2 heart sounds heard.

## 2021-08-18 NOTE — ED PROVIDER NOTE - TEMPLATE, MLM
Fluid/Electrolyte/Metabolic Methotrexate Pregnancy And Lactation Text: This medication is Pregnancy Category X and is known to cause fetal harm. This medication is excreted in breast milk.

## 2021-09-10 ENCOUNTER — NON-APPOINTMENT (OUTPATIENT)
Age: 46
End: 2021-09-10

## 2021-09-10 ENCOUNTER — APPOINTMENT (OUTPATIENT)
Dept: OPHTHALMOLOGY | Facility: CLINIC | Age: 46
End: 2021-09-10
Payer: MEDICARE

## 2021-09-10 PROCEDURE — 92015 DETERMINE REFRACTIVE STATE: CPT | Mod: NC

## 2021-09-10 PROCEDURE — 92014 COMPRE OPH EXAM EST PT 1/>: CPT

## 2021-09-28 ENCOUNTER — APPOINTMENT (OUTPATIENT)
Dept: OPHTHALMOLOGY | Facility: CLINIC | Age: 46
End: 2021-09-28

## 2021-10-12 ENCOUNTER — APPOINTMENT (OUTPATIENT)
Dept: UROLOGY | Facility: CLINIC | Age: 46
End: 2021-10-12

## 2021-10-15 ENCOUNTER — NON-APPOINTMENT (OUTPATIENT)
Age: 46
End: 2021-10-15

## 2021-10-28 ENCOUNTER — LABORATORY RESULT (OUTPATIENT)
Age: 46
End: 2021-10-28

## 2021-10-28 ENCOUNTER — APPOINTMENT (OUTPATIENT)
Dept: UROLOGY | Facility: CLINIC | Age: 46
End: 2021-10-28
Payer: MEDICARE

## 2021-10-28 VITALS
SYSTOLIC BLOOD PRESSURE: 156 MMHG | DIASTOLIC BLOOD PRESSURE: 90 MMHG | BODY MASS INDEX: 27.32 KG/M2 | HEIGHT: 66 IN | WEIGHT: 170 LBS | HEART RATE: 65 BPM | RESPIRATION RATE: 16 BRPM

## 2021-10-28 PROCEDURE — 99214 OFFICE O/P EST MOD 30 MIN: CPT

## 2021-10-28 NOTE — ASSESSMENT
[FreeTextEntry1] : The patient returns for follow-up.  He has been injecting 0.2 to 0.3 cc of the Trimix with good results.  He also wanted to discuss fertility options. His wife is 42 and i suggested ISIDRO consultation. I have also ordered repeat blood testing an a semen analysis and will discuss in two weeks by Telehealth.\par \par Consultation: 30 minutes:  10 minutes reviewing his history and performing a physical examination.  20 minutes reviewing the ultrasound, writing for prescription medications and blood studies ,discussing treatment options and writing his note. There was also additional time in preparation for today's visit.\par \par \par \par \par

## 2021-10-28 NOTE — PHYSICAL EXAM
[General Appearance - Well Developed] : well developed [General Appearance - Well Nourished] : well nourished [Normal Appearance] : normal appearance [Well Groomed] : well groomed [General Appearance - In No Acute Distress] : no acute distress [Abdomen Soft] : soft [Urethral Meatus] : meatus normal [Urinary Bladder Findings] : the bladder was normal on palpation [Scrotum] : the scrotum was normal [Testes Mass (___cm)] : there were no testicular masses [Skin Turgor] : supple [Oriented To Time, Place, And Person] : oriented to person, place, and time [Affect] : the affect was normal [Mood] : the mood was normal [Not Anxious] : not anxious [Normal Station and Gait] : the gait and station were normal for the patient's age [Penis Abnormality] : normal uncircumcised penis [FreeTextEntry1] : plaque at the base of his phallus along the ventral aspect

## 2021-10-28 NOTE — HISTORY OF PRESENT ILLNESS
[FreeTextEntry1] : The patient returns for follow-up.  He has been injecting 0.2 to 0.3 cc of the Trimix with good results.  He also wanted to discuss fertility options. \par \par PMH: Patient is a  diabetic male with chronic renal disease on dialysis who presents with a chief complaint of erectile dysfunction. He had been on penile injection therapy in the past. His erections are not modified with the degree of sexual stimulation.   He states that his erections presently are often less than 0 out of 10 in both tumescence and rigidity, insufficient for penetration.   He often ejaculates through a flaccid phallus.  He has difficulty maintaining an erection. He describes a normal libido.  His sexual dysfunction occurs with both sexual relations and masturbation.  His erections are not improved with PDE5 inhibitors.   His partner is understanding and was unable to be with him at the visit today. He is .  \par \par His past medical history is non-contributory.  In his present occupation he has no known toxin exposure. He does not smoke and drinks socially .  He has no known drug allergies. His review of systems and past medical history demonstrates no significant urologic issues (see patient completed questionnaire). His family history demonstrates no significant urologic issues.

## 2021-11-05 LAB
25(OH)D3 SERPL-MCNC: 9.7 NG/ML
ALBUMIN SERPL ELPH-MCNC: 3 G/DL
ALP BLD-CCNC: 130 U/L
ALT SERPL-CCNC: 22 U/L
ANION GAP SERPL CALC-SCNC: 10 MMOL/L
AST SERPL-CCNC: 32 U/L
BASOPHILS # BLD AUTO: 0.14 K/UL
BASOPHILS NFR BLD AUTO: 7 %
BILIRUB SERPL-MCNC: 0.2 MG/DL
BUN SERPL-MCNC: 26 MG/DL
CALCIUM SERPL-MCNC: 8.4 MG/DL
CHLORIDE SERPL-SCNC: 115 MMOL/L
CHOLEST SERPL-MCNC: 269 MG/DL
CO2 SERPL-SCNC: 17 MMOL/L
CREAT SERPL-MCNC: 3.69 MG/DL
EOSINOPHIL # BLD AUTO: 0.04 K/UL
EOSINOPHIL NFR BLD AUTO: 1.8 %
ESTIMATED AVERAGE GLUCOSE: 143 MG/DL
ESTRADIOL SERPL-MCNC: 40 PG/ML
FSH SERPL-MCNC: 4.6 IU/L
GLUCOSE SERPL-MCNC: 46 MG/DL
HBA1C MFR BLD HPLC: 6.6 %
HCT VFR BLD CALC: 43.4 %
HDLC SERPL-MCNC: 54 MG/DL
HGB BLD-MCNC: 13.6 G/DL
LDLC SERPL CALC-MCNC: 183 MG/DL
LH SERPL-ACNC: 11.2 IU/L
LYMPHOCYTES # BLD AUTO: 0.7 K/UL
LYMPHOCYTES NFR BLD AUTO: 34.2 %
MAN DIFF?: NORMAL
MCHC RBC-ENTMCNC: 28.3 PG
MCHC RBC-ENTMCNC: 31.3 GM/DL
MCV RBC AUTO: 90.2 FL
MONOCYTES # BLD AUTO: 0.14 K/UL
MONOCYTES NFR BLD AUTO: 7 %
NEUTROPHILS # BLD AUTO: 0.83 K/UL
NEUTROPHILS NFR BLD AUTO: 37.7 %
NONHDLC SERPL-MCNC: 215 MG/DL
PLATELET # BLD AUTO: 373 K/UL
POTASSIUM SERPL-SCNC: 5.7 MMOL/L
PROLACTIN SERPL-MCNC: 11 NG/ML
PROT SERPL-MCNC: 5.5 G/DL
PSA SERPL-MCNC: 0.4 NG/ML
RBC # BLD: 4.81 M/UL
RBC # FLD: 17 %
SODIUM SERPL-SCNC: 142 MMOL/L
TESTOST BND SERPL-MCNC: 11.2 PG/ML
TESTOSTERONE TOTAL S: 835 NG/DL
TRIGL SERPL-MCNC: 161 MG/DL
TSH SERPL-ACNC: 3.06 UIU/ML
WBC # FLD AUTO: 2.05 K/UL

## 2021-11-09 ENCOUNTER — APPOINTMENT (OUTPATIENT)
Dept: UROLOGY | Facility: CLINIC | Age: 46
End: 2021-11-09
Payer: MEDICARE

## 2021-11-09 PROCEDURE — 99214 OFFICE O/P EST MOD 30 MIN: CPT | Mod: 95

## 2021-11-09 NOTE — HISTORY OF PRESENT ILLNESS
[FreeTextEntry1] : The patient-doctor. relationship has been established in a face-to-face fashion on real-time video audio HIPAA compliant communication using telemedicine software. The patient was at home and the physician was in his office. The patient's identity has been confirmed.  The patient was previously emailed a copy of the telemedicine consent.  The patient has had a chance to review and has now given verbal consent and has requested care to be assessed and treated through telemedicine. The patient understands there may be limitations in this process and that they need not need further follow-up care in the office and/or hospital settings. We were unable to use the [employer] platform and an alternative platform was utilized.  The patient was at home and I was in the office.\par \par Verbal consent was given on Tuesday, November 9, 2021 at 10:45 AM by the patient.  It was requested by the physician.  A written consent was previously sent for the patient to sign and return.\par \par The patient returns for follow-up to review his recent blood studies and to discuss options for therapy.\par \par PMH: Patient is a  diabetic male with chronic renal disease on dialysis who presents with a chief complaint of erectile dysfunction. He had been on penile injection therapy in the past. His erections are not modified with the degree of sexual stimulation.   He states that his erections presently are often less than 0 out of 10 in both tumescence and rigidity, insufficient for penetration.   He often ejaculates through a flaccid phallus.  He has difficulty maintaining an erection. He describes a normal libido.  His sexual dysfunction occurs with both sexual relations and masturbation.  His erections are not improved with PDE5 inhibitors.   His partner is understanding and was unable to be with him at the visit today. He is .  \par \par His past medical history is non-contributory.  In his present occupation he has no known toxin exposure. He does not smoke and drinks socially .  He has no known drug allergies. His review of systems and past medical history demonstrates no significant urologic issues (see patient completed questionnaire). His family history demonstrates no significant urologic issues.

## 2021-11-09 NOTE — ASSESSMENT
[FreeTextEntry1] : The patient returns for follow-up to review his recent blood studies and to discuss options for therapy.  He has been injecting 0.2 to 0.3 cc of the Trimix with good results.  He has not yet obtained a semen analysis. Blood test demonstrated a low white count of 2.05K per microliter, and a hematocrit of 43.4%.  His hemoglobin A1c was down from a high of 8% to 6.6%.  His cholesterol was 269 mg/dL with an LDL of 183 mg/dL medical evaluation was recommended.  His potassium was elevated at 5.7 mmol/L with a creatinine of 3.69 mg/dL.  He is under close monitoring by his nephrologist.  His prolactin is 11 ng/mL with an FSH of 4.6 IU/L a TSH of 3.06 µIU/mL and an estradiol of 40 pg/mL.  His LH was 11.2 IU/L with a vitamin D of 9.7 ng/mL and supplementation was advised and is also advised by his nephrologist.  His PSA was 0.4 ng/mL.  His testosterone free was 11.2 pg/mL with a total testosterone of 835 ng/dL.\par \par He will be obtaining a semen analysis and will discuss the results when they return.  I will then offer other suggestions for his fertility concerns.\par \par Telehealth Consultation: 30 minutes  10 minutes reviewing his history and discussing prior results.  20 minutes discussing various treatment options, writing requests for lab testing and writing his note. There was also additional time in preparing for the visit and assisting the patient with technology issues he was having with the telehealth platform.\par \par \par \par \par

## 2022-01-31 ENCOUNTER — APPOINTMENT (OUTPATIENT)
Dept: UROLOGY | Facility: CLINIC | Age: 47
End: 2022-01-31
Payer: MEDICARE

## 2022-01-31 DIAGNOSIS — N52.9 MALE ERECTILE DYSFUNCTION, UNSPECIFIED: ICD-10-CM

## 2022-01-31 PROCEDURE — 99214 OFFICE O/P EST MOD 30 MIN: CPT | Mod: 95

## 2022-01-31 NOTE — ASSESSMENT
[FreeTextEntry1] : The patient returns for follow-up.  He has not yet scheduled a semen analysis.  He had Covid on  and has been just getting better recently.  He will schedule that appointment.  He had been using  medication of the Trimix.  He needed a new prescription.  He has been injecting 0.2 to 0.3 cc of the medication with good results.  I renewed his medication after reviewing with him the proper use of the medication with its potential side effects.\par \par Blood test demonstrated a low white count of 2.05K per microliter, and a hematocrit of 43.4%.  His hemoglobin A1c was down from a high of 8% to 6.6%.  His cholesterol was 269 mg/dL with an LDL of 183 mg/dL medical evaluation was recommended.  His potassium was elevated at 5.7 mmol/L with a creatinine of 3.69 mg/dL.  He is under close monitoring by his nephrologist.  His prolactin is 11 ng/mL with an FSH of 4.6 IU/L a TSH of 3.06 µIU/mL and an estradiol of 40 pg/mL.  His LH was 11.2 IU/L with a vitamin D of 9.7 ng/mL and supplementation was advised and is also advised by his nephrologist.  His PSA was 0.4 ng/mL.  His testosterone free was 11.2 pg/mL with a total testosterone of 835 ng/dL.\par \par He will be obtaining a semen analysis (had Covid 19 on 2021)  and will discuss the results when they return.  I will then offer other suggestions for his fertility concerns after the results return.\par \par Telehealth Consultation: 30 minutes  10 minutes reviewing his history and discussing prior results.  20 minutes discussing various treatment options, writing prescription requests and writing his note. There was also additional time in preparing for the visit and assisting the patient with technology issues he was having with the telehealth platform.\par \par \par \par \par

## 2022-01-31 NOTE — HISTORY OF PRESENT ILLNESS
[FreeTextEntry1] : The patient-doctor. relationship has been established in a face-to-face fashion on real-time video audio HIPAA compliant communication using telemedicine software. The patient was at home and the physician was in his office. The patient's identity has been confirmed.  The patient was previously emailed a copy of the telemedicine consent.  The patient has had a chance to review and has now given verbal consent and has requested care to be assessed and treated through telemedicine. The patient understands there may be limitations in this process and that they need not need further follow-up care in the office and/or hospital settings. We were unable to use the [employer] platform and an alternative platform was utilized.  The patient was at home and I was in the office.\par \par Verbal consent was given on 2021 at 10:45 AM by the patient.  It was requested by the physician.  A written consent was previously sent for the patient to sign and return.\par \par The patient returns for follow-up.  He has not yet scheduled a semen analysis.  He had Covid on  and has been just getting better recently.  He will schedule that appointment.  He had been using  medication of the Trimix.  He needed a new prescription.\par \par PMH: Patient is a  diabetic male with chronic renal disease on dialysis who presents with a chief complaint of erectile dysfunction. He had been on penile injection therapy in the past. His erections are not modified with the degree of sexual stimulation.   He states that his erections presently are often less than 0 out of 10 in both tumescence and rigidity, insufficient for penetration.   He often ejaculates through a flaccid phallus.  He has difficulty maintaining an erection. He describes a normal libido.  His sexual dysfunction occurs with both sexual relations and masturbation.  His erections are not improved with PDE5 inhibitors.   His partner is understanding and was unable to be with him at the visit today. He is .  \par \par His past medical history is non-contributory.  In his present occupation he has no known toxin exposure. He does not smoke and drinks socially .  He has no known drug allergies. His review of systems and past medical history demonstrates no significant urologic issues (see patient completed questionnaire). His family history demonstrates no significant urologic issues.

## 2022-04-11 PROBLEM — Z11.59 SCREENING FOR VIRAL DISEASE: Status: ACTIVE | Noted: 2020-06-18

## 2022-05-10 ENCOUNTER — NON-APPOINTMENT (OUTPATIENT)
Age: 47
End: 2022-05-10

## 2022-05-10 ENCOUNTER — APPOINTMENT (OUTPATIENT)
Dept: OPHTHALMOLOGY | Facility: CLINIC | Age: 47
End: 2022-05-10
Payer: MEDICARE

## 2022-05-10 PROCEDURE — 92250 FUNDUS PHOTOGRAPHY W/I&R: CPT

## 2022-05-10 PROCEDURE — 92014 COMPRE OPH EXAM EST PT 1/>: CPT

## 2022-10-10 NOTE — PHYSICAL EXAM
[General Appearance - Well Developed] : well developed [General Appearance - Well Nourished] : well nourished [Normal Appearance] : normal appearance [Well Groomed] : well groomed [General Appearance - In No Acute Distress] : no acute distress [Abdomen Soft] : soft [Urethral Meatus] : meatus normal [Penis Abnormality] : normal uncircumcised penis [Urinary Bladder Findings] : the bladder was normal on palpation [Scrotum] : the scrotum was normal [Testes Mass (___cm)] : there were no testicular masses [FreeTextEntry1] : plaque at the base of his phallus along the ventral aspect [Skin Turgor] : supple [Oriented To Time, Place, And Person] : oriented to person, place, and time [Affect] : the affect was normal [Mood] : the mood was normal [Not Anxious] : not anxious [Normal Station and Gait] : the gait and station were normal for the patient's age

## 2022-10-10 NOTE — ASSESSMENT
[FreeTextEntry1] : The patient returns for follow-up.  He has not yet scheduled a semen analysis.  He had Covid on  and has been just getting better recently.  He will schedule that appointment.  He had been using  medication of the Trimix.  He needed a new prescription.  He has been injecting 0.2 to 0.3 cc of the medication with good results.  I renewed his medication after reviewing with him the proper use of the medication with its potential side effects.\par \par Blood test demonstrated a low white count of 2.05K per microliter, and a hematocrit of 43.4%.  His hemoglobin A1c was down from a high of 8% to 6.6%.  His cholesterol was 269 mg/dL with an LDL of 183 mg/dL medical evaluation was recommended.  His potassium was elevated at 5.7 mmol/L with a creatinine of 3.69 mg/dL.  He is under close monitoring by his nephrologist.  His prolactin is 11 ng/mL with an FSH of 4.6 IU/L a TSH of 3.06 µIU/mL and an estradiol of 40 pg/mL.  His LH was 11.2 IU/L with a vitamin D of 9.7 ng/mL and supplementation was advised and is also advised by his nephrologist.  His PSA was 0.4 ng/mL.  His testosterone free was 11.2 pg/mL with a total testosterone of 835 ng/dL.\par \par He will be obtaining a semen analysis (had Covid 19 on 2021)  and will discuss the results when they return.  I will then offer other suggestions for his fertility concerns after the results return.\par \par Consultation: 30 minutes:  10 minutes reviewing his history and performing a physical examination.  20 minutes reviewing his semen analysis, writing for prescription medications and blood studies ,discussing treatment options and writing his note. There was also additional time in preparation for today's visit.\par \par \par \par \par \par

## 2022-10-10 NOTE — HISTORY OF PRESENT ILLNESS
[FreeTextEntry1] : The patient returns for follow-up.  He has not yet scheduled a semen analysis.  He had Covid on  and has been just getting better recently.  He will schedule that appointment.  He had been using  medication of the Trimix.  He needed a new prescription.\par \par PMH: Patient is a  diabetic male with chronic renal disease on dialysis who presents with a chief complaint of erectile dysfunction. He had been on penile injection therapy in the past. His erections are not modified with the degree of sexual stimulation.   He states that his erections presently are often less than 0 out of 10 in both tumescence and rigidity, insufficient for penetration.   He often ejaculates through a flaccid phallus.  He has difficulty maintaining an erection. He describes a normal libido.  His sexual dysfunction occurs with both sexual relations and masturbation.  His erections are not improved with PDE5 inhibitors.   His partner is understanding and was unable to be with him at the visit today. He is .  \par \par His past medical history is non-contributory.  In his present occupation he has no known toxin exposure. He does not smoke and drinks socially .  He has no known drug allergies. His review of systems and past medical history demonstrates no significant urologic issues (see patient completed questionnaire). His family history demonstrates no significant urologic issues.

## 2022-10-11 ENCOUNTER — APPOINTMENT (OUTPATIENT)
Dept: UROLOGY | Facility: CLINIC | Age: 47
End: 2022-10-11

## 2022-11-11 ENCOUNTER — APPOINTMENT (OUTPATIENT)
Dept: OPHTHALMOLOGY | Facility: CLINIC | Age: 47
End: 2022-11-11

## 2023-04-05 ENCOUNTER — APPOINTMENT (OUTPATIENT)
Dept: FAMILY MEDICINE | Facility: CLINIC | Age: 48
End: 2023-04-05

## 2023-06-08 ENCOUNTER — NON-APPOINTMENT (OUTPATIENT)
Age: 48
End: 2023-06-08

## 2023-06-12 PROBLEM — N52.9 MALE ERECTILE DISORDER: Status: ACTIVE | Noted: 2020-09-01

## 2023-06-12 PROBLEM — E10.9 TYPE 1 DIABETES MELLITUS: Status: ACTIVE | Noted: 2018-11-20

## 2023-06-13 ENCOUNTER — APPOINTMENT (OUTPATIENT)
Dept: UROLOGY | Facility: CLINIC | Age: 48
End: 2023-06-13
Payer: MEDICARE

## 2023-06-13 DIAGNOSIS — N53.19 OTHER EJACULATORY DYSFUNCTION: ICD-10-CM

## 2023-06-13 DIAGNOSIS — E10.9 TYPE 1 DIABETES MELLITUS W/OUT COMPLICATIONS: ICD-10-CM

## 2023-06-13 DIAGNOSIS — N52.9 MALE ERECTILE DYSFUNCTION, UNSPECIFIED: ICD-10-CM

## 2023-06-13 PROCEDURE — 99214 OFFICE O/P EST MOD 30 MIN: CPT | Mod: 95

## 2023-06-13 RX ORDER — PAPAVERINE HYDROCHLORIDE 30 MG/ML
30 INJECTION, SOLUTION INTRAVENOUS
Qty: 5 | Refills: 2 | Status: ACTIVE | COMMUNITY
Start: 2023-06-13 | End: 1900-01-01

## 2023-06-13 NOTE — HISTORY OF PRESENT ILLNESS
[FreeTextEntry1] : The patient-doctor. relationship has been established in a face-to-face fashion on real-time video audio HIPAA compliant communication using telemedicine software. The patient was at home and the physician was in his office. The patient's identity has been confirmed.  The patient was previously emailed a copy of the telemedicine consent.  The patient has had a chance to review and has now given verbal consent and has requested care to be assessed and treated through telemedicine. The patient understands there may be limitations in this process and that they need not need further follow-up care in the office and/or hospital settings. We were unable to use the [employer] platform and an alternative platform was utilized.  The patient was at home and I was in the office.\par \par Verbal consent was given on Tuesday, November 9, 2021 at 10:45 AM by the patient.  It was requested by the physician.  A written consent was previously sent for the patient to sign and return.\par \par The patient returns for follow-up.  He was last seen in January 2022.  Retrograde semen analysis demonstrated no sperm in the post ejaculate urine.He had a cardiac stent in April as a com plication of kidney transplant.\par \par PMH: Patient is a  diabetic male with chronic renal disease on dialysis who presents with a chief complaint of erectile dysfunction. He had been on penile injection therapy in the past. His erections are not modified with the degree of sexual stimulation.   He states that his erections presently are often less than 0 out of 10 in both tumescence and rigidity, insufficient for penetration.   He often ejaculates through a flaccid phallus.  He has difficulty maintaining an erection. He describes a normal libido.  His sexual dysfunction occurs with both sexual relations and masturbation.  His erections are not improved with PDE5 inhibitors.   His partner is understanding and was unable to be with him at the visit today. He is .  \par \par His past medical history is non-contributory.  In his present occupation he has no known toxin exposure. He does not smoke and drinks socially .  He has no known drug allergies. His review of systems and past medical history demonstrates no significant urologic issues (see patient completed questionnaire). His family history demonstrates no significant urologic issues.

## 2023-06-13 NOTE — ASSESSMENT
[FreeTextEntry1] : The patient returns for follow-up.  He was last seen in 2022.  Retrograde semen analysis demonstrated no sperm in the post ejaculate urine.   He had been using  medication of the Trimix.  I have cautioned him about doing this.  He needed a new prescription.\par \par Blood test in 2021 demonstrated a low white count of 2.05K per microliter, and a hematocrit of 43.4%.  His hemoglobin A1c was down from a high of 8% to 6.6%.  His cholesterol was 269 mg/dL with an LDL of 183 mg/dL medical evaluation was recommended.  His potassium was elevated at 5.7 mmol/L with a creatinine of 3.69 mg/dL.  He is under close monitoring by his nephrologist.  His prolactin is 11 ng/mL with an FSH of 4.6 IU/L a TSH of 3.06 µIU/mL and an estradiol of 40 pg/mL.  His LH was 11.2 IU/L with a vitamin D of 9.7 ng/mL and supplementation was advised and is also advised by his nephrologist.  His PSA was 0.4 ng/mL.  His testosterone free was 11.2 pg/mL with a total testosterone of 835 ng/dL.\par \par He will continue with Trimix and will repeat blood studies at this time and a semen analysis.  He is interested in fertility.  I will have him back in 2 to 4 weeks to review the blood test and semen analysis and discuss options for therapy.\par \par Telehealth Consultation: 30 minutes  10 minutes reviewing his history and discussing prior results.  20 minutes discussing various treatment options, writing prescription requests and writing his note. There was also additional time in preparing for the visit and assisting the patient with technology issues he was having with the telehealth platform.\par \par \par \par \par

## 2023-10-05 ENCOUNTER — EMERGENCY (EMERGENCY)
Facility: HOSPITAL | Age: 48
LOS: 1 days | Discharge: HOME | End: 2023-10-05
Attending: STUDENT IN AN ORGANIZED HEALTH CARE EDUCATION/TRAINING PROGRAM
Payer: COMMERCIAL

## 2023-10-05 VITALS — TEMPERATURE: 99 F | DIASTOLIC BLOOD PRESSURE: 88 MMHG | SYSTOLIC BLOOD PRESSURE: 198 MMHG | HEART RATE: 102 BPM

## 2023-10-05 LAB
ALBUMIN SERPL ELPH-MCNC: 3.8 G/DL — SIGNIFICANT CHANGE UP (ref 3.3–5.2)
ALP SERPL-CCNC: 90 U/L — SIGNIFICANT CHANGE UP (ref 40–120)
ALT FLD-CCNC: 22 U/L — SIGNIFICANT CHANGE UP
ANION GAP SERPL CALC-SCNC: 19 MMOL/L — HIGH (ref 5–17)
AST SERPL-CCNC: 25 U/L — SIGNIFICANT CHANGE UP
BASOPHILS # BLD AUTO: 0.04 K/UL — SIGNIFICANT CHANGE UP (ref 0–0.2)
BASOPHILS NFR BLD AUTO: 0.4 % — SIGNIFICANT CHANGE UP (ref 0–2)
BILIRUB SERPL-MCNC: <0.2 MG/DL — LOW (ref 0.4–2)
BUN SERPL-MCNC: 91.4 MG/DL — HIGH (ref 8–20)
CALCIUM SERPL-MCNC: 8.3 MG/DL — LOW (ref 8.4–10.5)
CHLORIDE SERPL-SCNC: 103 MMOL/L — SIGNIFICANT CHANGE UP (ref 96–108)
CO2 SERPL-SCNC: 19 MMOL/L — LOW (ref 22–29)
CREAT SERPL-MCNC: 6.48 MG/DL — HIGH (ref 0.5–1.3)
EGFR: 10 ML/MIN/1.73M2 — LOW
EOSINOPHIL # BLD AUTO: 0.08 K/UL — SIGNIFICANT CHANGE UP (ref 0–0.5)
EOSINOPHIL NFR BLD AUTO: 0.9 % — SIGNIFICANT CHANGE UP (ref 0–6)
GLUCOSE SERPL-MCNC: 148 MG/DL — HIGH (ref 70–99)
HCT VFR BLD CALC: 36.6 % — LOW (ref 39–50)
HGB BLD-MCNC: 11.8 G/DL — LOW (ref 13–17)
IMM GRANULOCYTES NFR BLD AUTO: 1.4 % — HIGH (ref 0–0.9)
LYMPHOCYTES # BLD AUTO: 1.31 K/UL — SIGNIFICANT CHANGE UP (ref 1–3.3)
LYMPHOCYTES # BLD AUTO: 14.1 % — SIGNIFICANT CHANGE UP (ref 13–44)
MCHC RBC-ENTMCNC: 25.7 PG — LOW (ref 27–34)
MCHC RBC-ENTMCNC: 32.2 GM/DL — SIGNIFICANT CHANGE UP (ref 32–36)
MCV RBC AUTO: 79.6 FL — LOW (ref 80–100)
MONOCYTES # BLD AUTO: 1.06 K/UL — HIGH (ref 0–0.9)
MONOCYTES NFR BLD AUTO: 11.4 % — SIGNIFICANT CHANGE UP (ref 2–14)
NEUTROPHILS # BLD AUTO: 6.66 K/UL — SIGNIFICANT CHANGE UP (ref 1.8–7.4)
NEUTROPHILS NFR BLD AUTO: 71.8 % — SIGNIFICANT CHANGE UP (ref 43–77)
PLATELET # BLD AUTO: 334 K/UL — SIGNIFICANT CHANGE UP (ref 150–400)
POTASSIUM SERPL-MCNC: 4.5 MMOL/L — SIGNIFICANT CHANGE UP (ref 3.5–5.3)
POTASSIUM SERPL-SCNC: 4.5 MMOL/L — SIGNIFICANT CHANGE UP (ref 3.5–5.3)
PROT SERPL-MCNC: 7.2 G/DL — SIGNIFICANT CHANGE UP (ref 6.6–8.7)
RBC # BLD: 4.6 M/UL — SIGNIFICANT CHANGE UP (ref 4.2–5.8)
RBC # FLD: 13 % — SIGNIFICANT CHANGE UP (ref 10.3–14.5)
SODIUM SERPL-SCNC: 141 MMOL/L — SIGNIFICANT CHANGE UP (ref 135–145)
WBC # BLD: 9.28 K/UL — SIGNIFICANT CHANGE UP (ref 3.8–10.5)
WBC # FLD AUTO: 9.28 K/UL — SIGNIFICANT CHANGE UP (ref 3.8–10.5)

## 2023-10-05 PROCEDURE — 82962 GLUCOSE BLOOD TEST: CPT

## 2023-10-05 PROCEDURE — 80053 COMPREHEN METABOLIC PANEL: CPT

## 2023-10-05 PROCEDURE — 99284 EMERGENCY DEPT VISIT MOD MDM: CPT | Mod: 25

## 2023-10-05 PROCEDURE — 96374 THER/PROPH/DIAG INJ IV PUSH: CPT

## 2023-10-05 PROCEDURE — 99291 CRITICAL CARE FIRST HOUR: CPT

## 2023-10-05 PROCEDURE — 36415 COLL VENOUS BLD VENIPUNCTURE: CPT

## 2023-10-05 PROCEDURE — 85025 COMPLETE CBC W/AUTO DIFF WBC: CPT

## 2023-10-05 RX ORDER — NIFEDIPINE 30 MG
30 TABLET, EXTENDED RELEASE 24 HR ORAL ONCE
Refills: 0 | Status: COMPLETED | OUTPATIENT
Start: 2023-10-05 | End: 2023-10-05

## 2023-10-05 RX ORDER — DEXTROSE 50 % IN WATER 50 %
50 SYRINGE (ML) INTRAVENOUS ONCE
Refills: 0 | Status: COMPLETED | OUTPATIENT
Start: 2023-10-05 | End: 2023-10-05

## 2023-10-05 RX ORDER — METOPROLOL TARTRATE 50 MG
50 TABLET ORAL ONCE
Refills: 0 | Status: COMPLETED | OUTPATIENT
Start: 2023-10-05 | End: 2023-10-05

## 2023-10-05 RX ADMIN — Medication 50 MILLIGRAM(S): at 23:02

## 2023-10-05 RX ADMIN — Medication 30 MILLIGRAM(S): at 23:02

## 2023-10-05 RX ADMIN — Medication 50 MILLILITER(S): at 21:55
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
## 2023-10-06 NOTE — ED PROVIDER NOTE - PATIENT PORTAL LINK FT
You can access the FollowMyHealth Patient Portal offered by [facility] by registering at the following website: [email or web address]. By joining [facility]’s FollowMyHealth portal, you will also be able to view your health information using other applications (apps) compatible with our system.
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
## 2023-10-06 NOTE — ED PROVIDER NOTE - CLINICAL SUMMARY MEDICAL DECISION MAKING FREE TEXT BOX
labs reviewed. meds given. BP improved went from 230 to 198 don't want to overcorrect. Pt with acute on chronic kidney injury. plan was to transfer to [facility] to have him taken care of by his xplant team but patient does not want to go. Pt understands he is leaving against medical advice. Pt is alert and competent to make decisions currently and FSBS was 90 right before this conversation.  pt understands that his xplant kidney could stop functioning. Pt understands he could drop his sugar again and pass out and die or have brain damage but still wants to leave against medical advice. instruted to return at any time.  Pt given a copy of all results and instructed to f/up with pcp regarding any abnormal results.

## 2024-03-04 ENCOUNTER — NON-APPOINTMENT (OUTPATIENT)
Age: 49
End: 2024-03-04